# Patient Record
Sex: FEMALE | Race: WHITE | URBAN - METROPOLITAN AREA
[De-identification: names, ages, dates, MRNs, and addresses within clinical notes are randomized per-mention and may not be internally consistent; named-entity substitution may affect disease eponyms.]

---

## 2017-01-17 ENCOUNTER — TELEPHONE (OUTPATIENT)
Dept: FAMILY MEDICINE | Facility: CLINIC | Age: 47
End: 2017-01-17

## 2017-01-17 ENCOUNTER — OFFICE VISIT (OUTPATIENT)
Dept: FAMILY MEDICINE | Facility: CLINIC | Age: 47
End: 2017-01-17
Payer: COMMERCIAL

## 2017-01-17 VITALS
WEIGHT: 140 LBS | OXYGEN SATURATION: 98 % | DIASTOLIC BLOOD PRESSURE: 62 MMHG | BODY MASS INDEX: 23.9 KG/M2 | HEIGHT: 64 IN | TEMPERATURE: 98.5 F | SYSTOLIC BLOOD PRESSURE: 98 MMHG | HEART RATE: 106 BPM

## 2017-01-17 DIAGNOSIS — J01.90 ACUTE SINUSITIS TREATED WITH ANTIBIOTICS IN THE PAST 60 DAYS: Primary | ICD-10-CM

## 2017-01-17 PROCEDURE — 99213 OFFICE O/P EST LOW 20 MIN: CPT | Performed by: INTERNAL MEDICINE

## 2017-01-17 RX ORDER — DOXYCYCLINE HYCLATE 100 MG
100 TABLET ORAL 2 TIMES DAILY
Qty: 14 TABLET | Refills: 0 | Status: SHIPPED | OUTPATIENT
Start: 2017-01-17 | End: 2018-04-03

## 2017-01-17 NOTE — NURSING NOTE
"Chief Complaint   Patient presents with     Cough       Initial BP 98/62 mmHg  Pulse 106  Temp(Src) 98.5  F (36.9  C) (Tympanic)  Ht 5' 4\" (1.626 m)  Wt 140 lb (63.504 kg)  BMI 24.02 kg/m2  SpO2 98%  Breastfeeding? No Estimated body mass index is 24.02 kg/(m^2) as calculated from the following:    Height as of this encounter: 5' 4\" (1.626 m).    Weight as of this encounter: 140 lb (63.504 kg).  BP completed using cuff size: regular    Health Maintenance Due   Topic Date Due     PAP Q3 YR  08/07/2015     DEPRESSION ACTION PLAN Q1 YR (NO INBASKET)  08/19/2016     INFLUENZA VACCINE (SYSTEM ASSIGNED)  09/01/2016         Jaimie Taylor MA 1/17/17          "

## 2017-01-17 NOTE — TELEPHONE ENCOUNTER
Patient states she has had an ongoing cough and sinus concern since Thanksgiving.  States after jacob she wet online to [employer] and received prescription for cough which we think was tessalon pearls.  She then followed up with them due to not resolving and they prescribed her Amoxicillin which she has not had any relief from.  She completed the Amoxicillin early last week.  Still has deep productive cough, has had a headache for 3 days which she has been taking Advil for and lots of drainage.  Advised patient to follow up in clinic with appointment for hands on evaluation by provider and possible xray.  Patient agreed with plan and was assisted with scheduling appointment today.  Cherrie Amado RN  [facility] [employer]

## 2017-01-17 NOTE — PROGRESS NOTES
SUBJECTIVE:                                                    Heber Rosales is a 46 year old female who presents to clinic today for the following health issues:      RESPIRATORY SYMPTOMS      Duration: Since Thanksgiving     Description  nasal congestion, rhinorrhea, cough and fatigue/malaise, and headache.     Severity: moderate    Accompanying signs and symptoms: None    History (predisposing factors):  none    Precipitating or alleviating factors: None    Therapies tried and outcome:  Did go online to [employer] and was given medication for the cough and amoxicillin. It has not been effective patient still complains of cough. Was triaged today, see triage encounter.      Heber has been congested and feeling poorly for weeks now.  She was diagnosed with a sinus infection on [facility] and given a rx for amoxicillin.  She completed this about a week ago.  After taking the antibiotic she felt quite a bit better, maybe at 70% now, but since completing it her symptoms have plateaued. She still is taking 800mg of ibuprofen every 4-6 hours to keep from having a splitting headache and has sinus pressure/drainage and a cough.  The drainage is more clear and cough is no longer constant throughout the day.  She has poor energy and poor appetite.         Patient Active Problem List   Diagnosis     CARDIOVASCULAR SCREENING; LDL GOAL LESS THAN 160     Panic attacks     History of partial thyroidectomy     Recurrent major depressive disorder, in partial remission (H)     Past Surgical History   Procedure Laterality Date     Laparoscopic myomectomy uterus  2006     D & c  2007     endometrial polyp excised     D & c  2008     endometriosis     Thyroidectomy  8/10/2012     left lobe, benign colloid nodule        Social History   Substance Use Topics     Smoking status: Never Smoker      Smokeless tobacco: Never Used     Alcohol Use: 0.6 oz/week     1 Standard drinks or equivalent per week      Comment: occ wine      "Family History   Problem Relation Age of Onset     Unknown/Adopted Mother      doesn't know maternal side     CANCER Father      bladder. Agent orange           ROS:  Constitutional, HEENT, cardiovascular, pulmonary, gi and gu systems are negative, except as otherwise noted.    OBJECTIVE:                                                    BP 98/62 mmHg  Pulse 106  Temp(Src) 98.5  F (36.9  C) (Tympanic)  Ht 5' 4\" (1.626 m)  Wt 140 lb (63.504 kg)  BMI 24.02 kg/m2  SpO2 98%  Breastfeeding? No  Body mass index is 24.02 kg/(m^2).    Gen: tired appearing, pleasant woman, no distress  HEENT: PERRL, no conjunctival injection, no posterior pharynx erythema, MMM.  TM normal b/l.  + maxillary sinus tenderness.   Neck: supple, no LAD  Pulm: breathing comfortably, CTAB, ? Rales at very bases b/l.   CV: mild tachycardia, regular, normal S1 and S2, no murmurs  Ext: 2+ radial pulses           ASSESSMENT/PLAN:                                                        1. Acute sinusitis treated with antibiotics in the past 60 days  It sounds like she has a partially treated sinus infection. She did get better, but continues to feel poorly with sinus pain a week after completing amoxicillin.   - doxycycline (VIBRA-TABS) 100 MG tablet; Take 1 tablet (100 mg) by mouth 2 times daily  Dispense: 14 tablet; Refill: 0  - reviewed ibuprofen dosing, symptomatic management.     F/U as needed for persistent or worsening symptoms.       Mira Buck MD  [facility]  "

## 2017-01-17 NOTE — PATIENT INSTRUCTIONS
You can use Afrin nasal spray for 3-5 days to help with congestion.   Maximum ibuprofen is 800mg three times per day.

## 2017-01-25 DIAGNOSIS — F33.41 RECURRENT MAJOR DEPRESSIVE DISORDER, IN PARTIAL REMISSION (H): Primary | ICD-10-CM

## 2017-01-25 NOTE — TELEPHONE ENCOUNTER
zoloft     Last Written Prescription Date: 11/30/16  Last Fill Quantity: 135, # refills: 0  Last Office Visit with [facility] primary care provider:  1/17/17        Last PHQ-9 score on record=   PHQ-9 SCORE 11/30/2016   Total Score -   Total Score MyChart 2 (Minimal depression)   Total Score -       Routing refill request to provider for review/approval because:  Drug interaction warning    Gracy Burgos RN   [facility] - Triage

## 2017-01-25 NOTE — Clinical Note
My Depression Action Plan  Name: Heber Rosales   Date of Birth 1970  Date: 1/27/2017    My doctor: Maria Eugenia Sams   My clinic: [address] [address]  [phone]          GREEN    ZONE   Good Control    What it looks like:     Things are going generally well. You have normal up s and down s. You may even feel depressed from time to time, but bad moods usually last less than a day.   What you need to do:  1. Continue to care for yourself (see self care plan)  2. Check your depression survival kit and update it as needed  3. Follow your physician s recommendations including any medication.  4. Do not stop taking medication unless you consult with your physician first.           YELLOW         ZONE Getting Worse    What it looks like:     Depression is starting to interfere with your life.     It may be hard to get out of bed; you may be starting to isolate yourself from others.    Symptoms of depression are starting to last most all day and this has happened for several days.     You may have suicidal thoughts but they are not constant.   What you need to do:     1. Call your care team, your response to treatment will improve if you keep your care team informed of your progress. Yellow periods are signs an adjustment may need to be made.     2. Continue your self-care, even if you have to fake it!    3. Talk to someone in your support network    4. Open up your depression survival kit           RED    ZONE Medical Alert - Get Help    What it looks like:     Depression is seriously interfering with your life.     You may experience these or other symptoms: You can t get out of bed most days, can t work or engage in other necessary activities, you have trouble taking care of basic hygiene, or basic responsibilities, thoughts of suicide or death that will not go away, self-injurious behavior.     What you need to do:  1. Call your care  team and request a same-day appointment. If they are not available (weekends or after hours) call your local crisis line, emergency room or 911.      Electronically signed by: Maria Eugenia Sams, January 27, 2017    Depression Self Care Plan / Survival Kit    Self-Care for Depression  Here s the deal. Your body and mind are really not as separate as most people think.  What you do and think affects how you feel and how you feel influences what you do and think. This means if you do things that people who feel good do, it will help you feel better.  Sometimes this is all it takes.  There is also a place for medication and therapy depending on how severe your depression is, so be sure to consult with your medical provider and/ or Behavioral Health Consultant if your symptoms are worsening or not improving.     In order to better manage my stress, I will:    Exercise  Get some form of exercise, every day. This will help reduce pain and release endorphins, the  feel good  chemicals in your brain. This is almost as good as taking antidepressants!  This is not the same as joining a gym and then never going! (they count on that by the way ) It can be as simple as just going for a walk or doing some gardening, anything that will get you moving.      Hygiene   Maintain good hygiene (Get out of bed in the morning, Make your bed, Brush your teeth, Take a shower, and Get dressed like you were going to work, even if you are unemployed).  If your clothes don't fit try to get ones that do.    Diet  I will strive to eat foods that are good for me, drink plenty of water, and avoid excessive sugar, caffeine, alcohol, and other mood-altering substances.  Some foods that are helpful in depression are: complex carbohydrates, B vitamins, flaxseed, fish or fish oil, fresh fruits and vegetables.    Psychotherapy  I agree to participate in Individual Therapy (if recommended).    Medication  If prescribed medications, I agree to take them.  Missing  doses can result in serious side effects.  I understand that drinking alcohol, or other illicit drug use, may cause potential side effects.  I will not stop my medication abruptly without first discussing it with my provider.    Staying Connected With Others  I will stay in touch with my friends, family members, and my primary care provider/team.    Use your imagination  Be creative.  We all have a creative side; it doesn t matter if it s oil painting, sand castles, or mud pies! This will also kick up the endorphins.    Witness Beauty  (AKA stop and smell the roses) Take a look outside, even in mid-winter. Notice colors, textures. Watch the squirrels and birds.     Service to others  Be of service to others.  There is always someone else in need.  By helping others we can  get out of ourselves  and remember the really important things.  This also provides opportunities for practicing all the other parts of the program.    Humor  Laugh and be silly!  Adjust your TV habits for less news and crime-drama and more comedy.    Control your stress  Try breathing deep, massage therapy, biofeedback, and meditation. Find time to relax each day.     My support system    Clinic Contact:  Phone number:    Contact 1:  Phone number:    Contact 2:  Phone number:    Worship/:  Phone number:    Therapist:  Phone number:    Local crisis center:    Phone number:    Other community support:  Phone number:

## 2017-01-27 RX ORDER — SERTRALINE HYDROCHLORIDE 100 MG/1
TABLET, FILM COATED ORAL
Qty: 135 TABLET | Refills: 1 | Status: SHIPPED | OUTPATIENT
Start: 2017-01-27 | End: 2018-07-31

## 2017-08-19 ENCOUNTER — HEALTH MAINTENANCE LETTER (OUTPATIENT)
Age: 47
End: 2017-08-19

## 2018-04-03 ENCOUNTER — OFFICE VISIT (OUTPATIENT)
Dept: SURGERY | Facility: CLINIC | Age: 48
End: 2018-04-03
Payer: COMMERCIAL

## 2018-04-03 VITALS
WEIGHT: 126 LBS | SYSTOLIC BLOOD PRESSURE: 100 MMHG | HEIGHT: 64 IN | DIASTOLIC BLOOD PRESSURE: 70 MMHG | HEART RATE: 111 BPM | BODY MASS INDEX: 21.51 KG/M2

## 2018-04-03 DIAGNOSIS — K40.90 RIGHT INGUINAL HERNIA: Primary | ICD-10-CM

## 2018-04-03 PROCEDURE — 99204 OFFICE O/P NEW MOD 45 MIN: CPT | Performed by: SURGERY

## 2018-04-03 NOTE — MR AVS SNAPSHOT
"              After Visit Summary   4/3/2018    Heber Rosales    MRN: [id number]           Patient Information     Date Of Birth          1970        Visit Information        Provider Department      4/3/2018 11:15 AM Nuha Powell MD [facility]       Follow-ups after your visit        Who to contact     If you have questions or need follow up information about today's clinic visit or your schedule please contact [facility] directly at [phone].  Normal or non-critical lab and imaging results will be communicated to you by "[employer]hart, letter or phone within 4 business days after the clinic has received the results. If you do not hear from us within 7 days, please contact the clinic through [employer] or phone. If you have a critical or abnormal lab result, we will notify you by phone as soon as possible.  Submit refill requests through [employer] or call your pharmacy and they will forward the refill request to us. Please allow 3 business days for your refill to be completed.          Additional Information About Your Visit        MyChart Information     [employer] gives you secure access to your electronic health record. If you see a primary care provider, you can also send messages to your care team and make appointments. If you have questions, please call your primary care clinic.  If you do not have a primary care provider, please call [phone] and they will assist you.        Care EveryWhere ID     This is your Care EveryWhere ID. This could be used by other organizations to access your [place] medical records  [id number]        Your Vitals Were     Pulse Height BMI (Body Mass Index)             111 5' 4\" (1.626 m) 21.63 kg/m2          Blood Pressure from Last 3 Encounters:   04/03/18 100/70   01/17/17 98/62   06/30/16 106/80    Weight from Last 3 Encounters:   04/03/18 126 lb (57.2 kg)   01/17/17 140 lb (63.5 kg) "   06/30/16 145 lb (65.8 kg)              Today, you had the following     No orders found for display       Primary Care Provider Office Phone # Fax #    Sierra Bowling [phone]       [facility] [address] [address]        Equal Access to Services     [facility] KATEY : Hadii aad ku hadasho Soomaali, waaxda luqadaha, qaybta kaalmada adeegyada, waxay idiin hayaan adeeg khbrittanysh la'gigin . So [facility] [phone].    ATENCIÓN: Si habla español, tiene a hansen disposición servicios gratuitos de asistencia lingüística. Llame al [phone].    We comply with applicable federal civil rights laws and [place] laws. We do not discriminate on the basis of race, color, national origin, age, disability, sex, sexual orientation, or gender identity.            Thank you!     Thank you for choosing [facility]  for your care. Our goal is always to provide you with excellent care. Hearing back from our patients is one way we can continue to improve our services. Please take a few minutes to complete the written survey that you may receive in the mail after your visit with us. Thank you!             Your Updated Medication List - Protect others around you: Learn how to safely use, store and throw away your medicines at www.disposemymeds.org.          This list is accurate as of 4/3/18 11:30 AM.  Always use your most recent med list.                   Brand Name Dispense Instructions for use Diagnosis    ALPRAZolam 0.25 MG tablet    XANAX    30 tablet    Take 1 tablet (0.25 mg) by mouth 2 times daily as needed for anxiety    Panic attacks       order for DME     1 Device    Equipment being ordered: wrist thumb right    Right wrist pain       sertraline 100 MG tablet    ZOLOFT    135 tablet    TAKE 1 1/2 TABLETS BY MOUTH DAILY    Recurrent major depressive disorder, in partial remission (H)

## 2018-04-03 NOTE — LETTER
April 3, 2018    Re: Heber Rosales - 1970    HISTORY OF PRESENT ILLNESS:  Heber Rosales is a 47 year old female who is seen in consultation at the request of Dr. Bowling for evaluation of right groin pain. She reports for the past 6 months she has had intermittent pain in her right groin. In November she noticed a bulge that would come and go. She is on spring break and saw her Ob/GYN yesterday at [facility] and was diagnosed with a right inguinal hernia. She denies hsitory of constipation. She has a single episode of diarrhea a few weeks ago. She reports mild low grade nausea and attributes this to increased stress lately. She has a history of a right sided uterine fibroid which was laparoscopically excised. She denies any obstructive symptoms.      REVIEW OF SYSTEMS:  10 point review of systems completed and otherwise negative aside from as listed in HPI.   Vitals: There were no vitals taken for this visit.  BMI= There is no height or weight on file to calculate BMI.     EXAM:  GENERAL: healthy, alert and no distress   PSYCH: pleasant, normal affect  HEENT: moist mucus membranes, no scleral icterus  CARDIOVASCULAR:  RRR  RESPIRATORY: non labored breathing  NECK: Neck supple. No adenopathy. Thyroid symmetric, normal size,  GI: soft, nontender, nondistended,  no hepatosplenomegaly, normal bowel sounds. Well healed laparoscopic scars.   : right inguinal hernia, reducible in supine position, not when standing. No hernias on left. Tender with reduction  Extremities: warm and well perfused, no edema  SKIN: No suspicious lesions or rashes    LYMPH: no axillary adenopathy     All labs and imaging personally reviewed and significant for: none     ASSESSMENT:  Heber Rosales is a 47 year old with a PMH s/f thyroid lobectomy who presents with a symptomatic right inguinal hernia. We discussed the natural history of hernias and risks of incarceration/strangulation. I would recommend repair.  We discussed open vs laparoscopic repair. I think she is a good candidate for laparoscopic repair as this is reducible and there is not a large bulge on exam. This will allow evaluation for femoral hernia as well.       PLAN:  Laparoscopic right inguinal hernia repair  It was my pleasure to participate in the care of Heber Rosalse in clinic today. Thank you for this consultation.       Nuha Powell MD

## 2018-04-03 NOTE — PROGRESS NOTES
[facility] Consultation    CHIEF COMPLAINT:  Right groin pain    HISTORY OF PRESENT ILLNESS:  Heber Rosales is a 47 year old female who is seen in consultation at the request of Dr. Bowling for evaluation of right groin pain. She reports for the past 6 months she has had intermittent pain in her right groin. In November she noticed a bulge that would come and go. She is on spring break and saw her Ob/GYN yesterday at [facility] and was diagnosed with a right inguinal hernia. She denies hsitory of constipation. She has a single episode of diarrhea a few weeks ago. She reports mild low grade nausea and attributes this to increased stress lately. She has a history of a right sided uterine fibroid which was laparoscopically excised. She denies any obstructive symptoms.     REVIEW OF SYSTEMS:  10 point review of systems completed and otherwise negative aside from as listed in HPI.     Past Medical History:   Diagnosis Date     Blood clot in the legs     superficial blood clots in leg post delivery     Colloid thyroid nodule 3/2012    left lobe, benign follicular adenoma     Endometriosis      Mild major depression (H)      Panic attacks      Uterine fibroid 2005    pedunculated right side of uterus       Past Surgical History:   Procedure Laterality Date     D & C  2007    endometrial polyp excised     D & C  2008    endometriosis     LAPAROSCOPIC MYOMECTOMY UTERUS  2006     THYROIDECTOMY  8/10/2012    left lobe, benign colloid nodule        Family History   Problem Relation Age of Onset     Unknown/Adopted Mother      doesn't know maternal side     CANCER Father      bladder. Agent orange       Social History   Substance Use Topics     Smoking status: Never Smoker     Smokeless tobacco: Never Used     Alcohol use 0.6 oz/week     1 Standard drinks or equivalent per week      Comment: occ wine       Patient Active Problem List   Diagnosis     CARDIOVASCULAR SCREENING; LDL GOAL LESS THAN  160     Panic attacks     History of partial thyroidectomy     Recurrent major depressive disorder, in partial remission (H)       No Known Allergies    Current Outpatient Prescriptions   Medication Sig Dispense Refill     sertraline (ZOLOFT) 100 MG tablet TAKE 1 1/2 TABLETS BY MOUTH DAILY 135 tablet 1     doxycycline (VIBRA-TABS) 100 MG tablet Take 1 tablet (100 mg) by mouth 2 times daily 14 tablet 0     sertraline (ZOLOFT) 100 MG tablet TAKE 1 1/2 TABLETS BY MOUTH DAILY 135 tablet 0     sertraline (ZOLOFT) 100 MG tablet Take 2 tablets (200 mg) by mouth daily TAKE 1 1/2 TABLETS BY MOUTH DAILY       naproxen (NAPROSYN) 500 MG tablet Take 1 tablet (500 mg) by mouth 2 times daily as needed for moderate pain 30 tablet 1     order for DME Equipment being ordered: wrist thumb right 1 Device 0     ALPRAZolam (XANAX) 0.25 MG tablet Take 1 tablet (0.25 mg) by mouth 2 times daily as needed for anxiety 30 tablet 0       Vitals: There were no vitals taken for this visit.  BMI= There is no height or weight on file to calculate BMI.    EXAM:  GENERAL: healthy, alert and no distress   PSYCH: pleasant, normal affect  HEENT: moist mucus membranes, no scleral icterus  CARDIOVASCULAR:  RRR  RESPIRATORY: non labored breathing  NECK: Neck supple. No adenopathy. Thyroid symmetric, normal size,  GI: soft, nontender, nondistended,  no hepatosplenomegaly, normal bowel sounds. Well healed laparoscopic scars.   : right inguinal hernia, reducible in supine position, not when standing. No hernias on left. Tender with reduction  Extremities: warm and well perfused, no edema  SKIN: No suspicious lesions or rashes    LYMPH: no axillary adenopathy    All labs and imaging personally reviewed and significant for: none    ASSESSMENT:  Heber Rosales is a 47 year old with a PMH s/f thyroid lobectomy who presents with a symptomatic right inguinal hernia. We discussed the natural history of hernias and risks of incarceration/strangulation. I  would recommend repair. We discussed open vs laparoscopic repair. I think she is a good candidate for laparoscopic repair as this is reducible and there is not a large bulge on exam. This will allow evaluation for femoral hernia as well.       PLAN:  Laparoscopic right inguinal hernia repair    It was my pleasure to participate in the care of Heber Rosales in clinic today. Thank you for this consultation.         Nuha Powell MD    Please route or send letter to:  Primary Care Provider (PCP) and Referring Provider

## 2018-04-04 ENCOUNTER — TELEPHONE (OUTPATIENT)
Dept: SURGERY | Facility: CLINIC | Age: 48
End: 2018-04-04

## 2018-04-04 NOTE — TELEPHONE ENCOUNTER
Type of surgery: Laparoscopic right inguinal hernia repair  Location of surgery: [facility]  Date and time of surgery: 4/30/18 at 10:30am  Surgeon: Dr. Nuha Powell  Pre-Op Appt Date: Patient to schedule  Post-Op Appt Date: Patient to schedule   Packet sent out: Yes  Pre-cert/Authorization completed:  Not Applicable  Date: 4/3/18

## 2018-04-06 ENCOUNTER — OFFICE VISIT (OUTPATIENT)
Dept: FAMILY MEDICINE | Facility: CLINIC | Age: 48
End: 2018-04-06
Payer: COMMERCIAL

## 2018-04-06 VITALS — SYSTOLIC BLOOD PRESSURE: 120 MMHG | HEART RATE: 76 BPM | DIASTOLIC BLOOD PRESSURE: 80 MMHG

## 2018-04-06 DIAGNOSIS — K40.90 RIGHT INGUINAL HERNIA: ICD-10-CM

## 2018-04-06 DIAGNOSIS — Z01.818 PREOP GENERAL PHYSICAL EXAM: Primary | ICD-10-CM

## 2018-04-06 DIAGNOSIS — F33.41 RECURRENT MAJOR DEPRESSIVE DISORDER, IN PARTIAL REMISSION (H): ICD-10-CM

## 2018-04-06 DIAGNOSIS — F41.0 PANIC ATTACKS: ICD-10-CM

## 2018-04-06 PROCEDURE — 99214 OFFICE O/P EST MOD 30 MIN: CPT | Performed by: INTERNAL MEDICINE

## 2018-04-06 RX ORDER — ALPRAZOLAM 0.25 MG
0.25 TABLET ORAL 2 TIMES DAILY PRN
Qty: 30 TABLET | Refills: 0 | Status: SHIPPED | OUTPATIENT
Start: 2018-04-06 | End: 2019-07-17

## 2018-04-06 NOTE — PROGRESS NOTES
[place] [id number] [facility] [address]  [phone]  Dept: [phone]    PRE-OP EVALUATION:  Today's date: 2018      **PREOP FAXED** Annalee Wright CMA     Heber Rosales (: 1970) presents for pre-operative evaluation assessment as requested by Dr. Powell.  She requires evaluation and anesthesia risk assessment prior to undergoing surgery/procedure for treatment of right inguinal hernia .    Fax number for surgical facility: na  Primary Physician: Sierra Bowling  Type of Anesthesia Anticipated: General    Patient has a Health Care Directive or Living Will:  Unsure     Preop Questions 2018   Who is doing your surgery? Dr. Powell   What are you having done? Inguinal hernia surgery   Date of Surgery/Procedure: 18   Facility or Hospital where procedure/surgery will be performed: [facility]   1.  Do you have a history of Heart attack, stroke, stent, coronary bypass surgery, or other heart surgery? No   2.  Do you ever have any pain or discomfort in your chest? No   3.  Do you have a history of  Heart Failure? No   4.   Are you troubled by shortness of breath when:  walking on a level surface, or up a slight hill, or at night? No   5.  Do you currently have a cold, bronchitis or other respiratory infection? YES - getting over a cold   6.  Do you have a cough, shortness of breath, or wheezing? YES - getting over a cold   7.  Do you sometimes get pains in the calves of your legs when you walk? No   8. Do you or anyone in your family have previous history of blood clots? YES - Heber had a blood clot post-partum   9.  Do you or does anyone in your family have a serious bleeding problem such as prolonged bleeding following surgeries or cuts? No   10. Have you ever had problems with anemia or been told to take iron pills? No   11. Have you had any abnormal blood loss such as black, tarry or bloody stools, or abnormal vaginal  bleeding? No   12. Have you ever had a blood transfusion? No   13. Have you or any of your relatives ever had problems with anesthesia? No   14. Do you have sleep apnea, excessive snoring or daytime drowsiness? No   15. Do you have any prosthetic heart valves? No   16. Do you have prosthetic joints? No   17. Is there any chance that you may be pregnant? No         HPI:     HPI related to upcoming procedure: right groin pain, found to have hernia.  Undergoing repair.      Getting over a cold, no SOB or fevers. Otherwise healthy.      Depression - just restarted sertraline, at 50mg daily.  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  Last filled for #30 in 4/2016.       MEDICAL HISTORY:     Patient Active Problem List    Diagnosis Date Noted     Recurrent major depressive disorder, in partial remission (H) 06/30/2016     Priority: Medium     Panic attacks      Priority: Medium     History of partial thyroidectomy 08/10/2012     Priority: Medium     CARDIOVASCULAR SCREENING; LDL GOAL LESS THAN 160 03/07/2012     Priority: Medium      Past Medical History:   Diagnosis Date     Blood clot in the legs     superficial blood clots in leg post delivery     Colloid thyroid nodule 3/2012    left lobe, benign follicular adenoma     Endometriosis      Mild major depression (H)      Panic attacks      Uterine fibroid 2005    pedunculated right side of uterus     Past Surgical History:   Procedure Laterality Date     D & C  2007    endometrial polyp excised     D & C  2008    endometriosis     LAPAROSCOPIC MYOMECTOMY UTERUS  2006     THYROIDECTOMY  8/10/2012    left lobe, benign colloid nodule      Current Outpatient Prescriptions   Medication Sig Dispense Refill     ALPRAZolam (XANAX) 0.25 MG tablet Take 1 tablet (0.25 mg) by mouth 2 times daily as needed for anxiety 30 tablet 0     sertraline (ZOLOFT) 100 MG tablet TAKE 1 1/2 TABLETS BY MOUTH DAILY (Patient  taking differently: 50mg daily) 135 tablet 1     order for DME Equipment being ordered: wrist thumb right (Patient not taking: Reported on 4/6/2018) 1 Device 0     OTC products: advil prn     No Known Allergies   Latex Allergy: NO    Social History   Substance Use Topics     Smoking status: Never Smoker     Smokeless tobacco: Never Used     Alcohol use 0.6 oz/week     1 Standard drinks or equivalent per week      Comment: occ wine     History   Drug Use No       REVIEW OF SYSTEMS:   Constitutional, HEENT, cardiovascular, pulmonary, gi, heme, and psych systems are negative, except as otherwise noted.    EXAM:   /80 (BP Location: Left arm, Patient Position: Chair, Cuff Size: Adult Regular)  Pulse 76  LMP 03/19/2018    Gen: well appearing, pleasant woman, no distress  HEENT: PERRL, sclera nonicteric, oropharynx clear, MMM  Neck: supple, no LAD  Pulm: breathing comfortably, CTAB, no wheezes or rales  CV: RRR, normal S1 and S2, no murmurs  Abd: BS present, soft, nontender, nondistended  Ext: 2+ distal pulses, no LE edema       DIAGNOSTICS:   EKG: Not indicated due to non-vascular surgery and low risk of event (age <65 and without cardiac risk factors)    Recent Labs   Lab Test  11/06/12   1553  08/09/12   0812   HGB  13.8  15.0   PLT  198   --    NA  144   --    POTASSIUM  4.2   --    CR  0.70   --         IMPRESSION:   Reason for surgery/procedure: right inguinal hernia   Diagnosis/reason for consult: pre-op    The proposed surgical procedure is considered INTERMEDIATE risk.    REVISED CARDIAC RISK INDEX  The patient has the following serious cardiovascular risks for perioperative complications such as (MI, PE, VFib and 3  AV Block):  No serious cardiac risks  INTERPRETATION: 0 risks: Class I (very low risk - 0.4% complication rate)    The patient has the following additional risks for perioperative complications:  No identified additional risks      ICD-10-CM    1. Preop general physical exam Z01.818    2.  Right inguinal hernia K40.90    3. Recurrent major depressive disorder, in partial remission (H) F33.41    4. Panic attacks F41.0 ALPRAZolam (XANAX) 0.25 MG tablet       RECOMMENDATIONS:       APPROVAL GIVEN to proceed with proposed procedure, without further diagnostic evaluation       Signed Electronically by: Mira Buck MD    Copy of this evaluation report is provided to requesting physician.    [facility] Preop Guidelines    Revised Cardiac Risk Index

## 2018-04-06 NOTE — MR AVS SNAPSHOT
After Visit Summary   4/6/2018    Heber Rosales    MRN: [id number]           Patient Information     Date Of Birth          1970        Visit Information        Provider Department      4/6/2018 1:00 PM Mira Buck MD [facility] [place]        Today's Diagnoses     Preop general physical exam    -  1    Panic attacks          Care Instructions      Before Your Surgery      Call your surgeon if there is any change in your health. This includes signs of a cold or flu (such as a sore throat, runny nose, cough, rash or fever).    Do not smoke, drink alcohol or take over the counter medicine (unless your surgeon or primary care doctor tells you to) for the 24 hours before and after surgery.    If you take prescribed drugs: Follow your doctor s orders about which medicines to take and which to stop until after surgery.    Eating and drinking prior to surgery: follow the instructions from your surgeon    Take a shower or bath the night before surgery. Use the soap your surgeon gave you to gently clean your skin. If you do not have soap from your surgeon, use your regular soap. Do not shave or scrub the surgery site.  Wear clean pajamas and have clean sheets on your bed.           Follow-ups after your visit        Your next 10 appointments already scheduled     Apr 30, 2018   Procedure with Nuha Powell MD   [facility] (--)    [address] [place] [address]   [phone]            Apr 30, 2018 10:15 AM CDT   [facility] Day Surgery with Nuha Powell MD   [facility] ([facility])    [facility] ([facility])   [phone]              Who to contact     If you have questions or need follow up information about today's clinic visit or your schedule please contact [facility] directly at [phone].  Normal or  non-critical lab and imaging results will be communicated to you by [employer] Videoshart, letter or phone within 4 business days after the clinic has received the results. If you do not hear from us within 7 days, please contact the clinic through [employer]ke[employer] or phone. If you have a critical or abnormal lab result, we will notify you by phone as soon as possible.  Submit refill requests through Trekea or call your pharmacy and they will forward the refill request to us. Please allow 3 business days for your refill to be completed.          Additional Information About Your Visit        Trekea Information     Trekea gives you secure access to your electronic health record. If you see a primary care provider, you can also send messages to your care team and make appointments. If you have questions, please call your primary care clinic.  If you do not have a primary care provider, please call [phone] and they will assist you.        Care EveryWhere ID     This is your Care EveryWhere ID. This could be used by other organizations to access your [facility] medical records  [id number]        Your Vitals Were     Pulse Last Period                76 03/19/2018           Blood Pressure from Last 3 Encounters:   04/06/18 120/80   04/03/18 100/70   01/17/17 98/62    Weight from Last 3 Encounters:   04/03/18 126 lb (57.2 kg)   01/17/17 140 lb (63.5 kg)   06/30/16 145 lb (65.8 kg)              Today, you had the following     No orders found for display         Today's Medication Changes          These changes are accurate as of 4/6/18  1:23 PM.  If you have any questions, ask your nurse or doctor.               These medicines have changed or have updated prescriptions.        Dose/Directions    sertraline 100 MG tablet   Commonly known as:  ZOLOFT   This may have changed:  See the new instructions.   Used for:  Recurrent major depressive disorder, in partial remission (H)        TAKE 1 1/2 TABLETS BY MOUTH DAILY   Quantity:  135 tablet    Refills:  1            Where to get your medicines      Some of these will need a paper prescription and others can be bought over the counter.  Ask your nurse if you have questions.     Bring a paper prescription for each of these medications     ALPRAZolam 0.25 MG tablet                Primary Care Provider Office Phone # Fax #    Sierra Bowling [phone]       [facility] [address] [place] [address]        Equal Access to Services     [facility] : Hadii aad ku hadasho Soomaali, waaxda luqadaha, qaybta kaalmada adeegyada, waxay idiin hayaan adeeg kharash la'omari . So [facility] [phone].    ATENCIÓN: Si habla espmalorie, tiene a hansen disposición servicios gratuitos de asistencia lingüística. [facility] [phone].    We comply with applicable federal civil rights laws and [place] laws. We do not discriminate on the basis of race, color, national origin, age, disability, sex, sexual orientation, or gender identity.            Thank you!     [facility] you [facility]. Our goal is always to provide you with excellent care. Hearing back from our patients is one way we can continue to improve our services. Please take a few minutes to complete the written survey that you may receive in the mail after your visit with us. Thank you!             Your Updated Medication List - Protect others around you: Learn how to safely use, store and throw away your medicines at www.disposemymeds.org.          This list is accurate as of 4/6/18  1:23 PM.  Always use your most recent med list.                   Brand Name Dispense Instructions for use Diagnosis    ALPRAZolam 0.25 MG tablet    XANAX    30 tablet    Take 1 tablet (0.25 mg) by mouth 2 times daily as needed for anxiety    Panic attacks       order for DME     1 Device    Equipment being ordered: wrist thumb right    Right wrist pain       sertraline 100 MG tablet    ZOLOFT    135 tablet    TAKE 1 1/2  TABLETS BY MOUTH DAILY    Recurrent major depressive disorder, in partial remission (H)

## 2018-04-26 NOTE — H&P (VIEW-ONLY)
[place] [address] [facility] [address]  [phone]  Dept: [phone]    PRE-OP EVALUATION:  Today's date: 2018      **PREOP FAXED** Annalee Wright CMA     Heber Rosales (: 1970) presents for pre-operative evaluation assessment as requested by Dr. Powell.  She requires evaluation and anesthesia risk assessment prior to undergoing surgery/procedure for treatment of right inguinal hernia .    Fax number for surgical facility: na  Primary Physician: Sierra Bowling  Type of Anesthesia Anticipated: General    Patient has a Health Care Directive or Living Will:  Unsure     Preop Questions 2018   Who is doing your surgery? Dr. Powell   What are you having done? Inguinal hernia surgery   Date of Surgery/Procedure: 18   Facility or Hospital where procedure/surgery will be performed: [facility]   1.  Do you have a history of Heart attack, stroke, stent, coronary bypass surgery, or other heart surgery? No   2.  Do you ever have any pain or discomfort in your chest? No   3.  Do you have a history of  Heart Failure? No   4.   Are you troubled by shortness of breath when:  walking on a level surface, or up a slight hill, or at night? No   5.  Do you currently have a cold, bronchitis or other respiratory infection? YES - getting over a cold   6.  Do you have a cough, shortness of breath, or wheezing? YES - getting over a cold   7.  Do you sometimes get pains in the calves of your legs when you walk? No   8. Do you or anyone in your family have previous history of blood clots? YES - Heber had a blood clot post-partum   9.  Do you or does anyone in your family have a serious bleeding problem such as prolonged bleeding following surgeries or cuts? No   10. Have you ever had problems with anemia or been told to take iron pills? No   11. Have you had any abnormal blood loss such as black, tarry or bloody stools, or abnormal vaginal  bleeding? No   12. Have you ever had a blood transfusion? No   13. Have you or any of your relatives ever had problems with anesthesia? No   14. Do you have sleep apnea, excessive snoring or daytime drowsiness? No   15. Do you have any prosthetic heart valves? No   16. Do you have prosthetic joints? No   17. Is there any chance that you may be pregnant? No         HPI:     HPI related to upcoming procedure: right groin pain, found to have hernia.  Undergoing repair.      Getting over a cold, no SOB or fevers. Otherwise healthy.      Depression - just restarted sertraline, at 50mg daily.  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  Last filled for #30 in 4/2016.       MEDICAL HISTORY:     Patient Active Problem List    Diagnosis Date Noted     Recurrent major depressive disorder, in partial remission (H) 06/30/2016     Priority: Medium     Panic attacks      Priority: Medium     History of partial thyroidectomy 08/10/2012     Priority: Medium     CARDIOVASCULAR SCREENING; LDL GOAL LESS THAN 160 03/07/2012     Priority: Medium      Past Medical History:   Diagnosis Date     Blood clot in the legs     superficial blood clots in leg post delivery     Colloid thyroid nodule 3/2012    left lobe, benign follicular adenoma     Endometriosis      Mild major depression (H)      Panic attacks      Uterine fibroid 2005    pedunculated right side of uterus     Past Surgical History:   Procedure Laterality Date     D & C  2007    endometrial polyp excised     D & C  2008    endometriosis     LAPAROSCOPIC MYOMECTOMY UTERUS  2006     THYROIDECTOMY  8/10/2012    left lobe, benign colloid nodule      Current Outpatient Prescriptions   Medication Sig Dispense Refill     ALPRAZolam (XANAX) 0.25 MG tablet Take 1 tablet (0.25 mg) by mouth 2 times daily as needed for anxiety 30 tablet 0     sertraline (ZOLOFT) 100 MG tablet TAKE 1 1/2 TABLETS BY MOUTH DAILY (Patient  taking differently: 50mg daily) 135 tablet 1     order for DME Equipment being ordered: wrist thumb right (Patient not taking: Reported on 4/6/2018) 1 Device 0     OTC products: advil prn     No Known Allergies   Latex Allergy: NO    Social History   Substance Use Topics     Smoking status: Never Smoker     Smokeless tobacco: Never Used     Alcohol use 0.6 oz/week     1 Standard drinks or equivalent per week      Comment: occ wine     History   Drug Use No       REVIEW OF SYSTEMS:   Constitutional, HEENT, cardiovascular, pulmonary, gi, heme, and psych systems are negative, except as otherwise noted.    EXAM:   /80 (BP Location: Left arm, Patient Position: Chair, Cuff Size: Adult Regular)  Pulse 76  LMP 03/19/2018    Gen: well appearing, pleasant woman, no distress  HEENT: PERRL, sclera nonicteric, oropharynx clear, MMM  Neck: supple, no LAD  Pulm: breathing comfortably, CTAB, no wheezes or rales  CV: RRR, normal S1 and S2, no murmurs  Abd: BS present, soft, nontender, nondistended  Ext: 2+ distal pulses, no LE edema       DIAGNOSTICS:   EKG: Not indicated due to non-vascular surgery and low risk of event (age <65 and without cardiac risk factors)    Recent Labs   Lab Test  11/06/12   1553  08/09/12   0812   HGB  13.8  15.0   PLT  198   --    NA  144   --    POTASSIUM  4.2   --    CR  0.70   --         IMPRESSION:   Reason for surgery/procedure: right inguinal hernia   Diagnosis/reason for consult: pre-op    The proposed surgical procedure is considered INTERMEDIATE risk.    REVISED CARDIAC RISK INDEX  The patient has the following serious cardiovascular risks for perioperative complications such as (MI, PE, VFib and 3  AV Block):  No serious cardiac risks  INTERPRETATION: 0 risks: Class I (very low risk - 0.4% complication rate)    The patient has the following additional risks for perioperative complications:  No identified additional risks      ICD-10-CM    1. Preop general physical exam Z01.818    2.  Right inguinal hernia K40.90    3. Recurrent major depressive disorder, in partial remission (H) F33.41    4. Panic attacks F41.0 ALPRAZolam (XANAX) 0.25 MG tablet       RECOMMENDATIONS:       APPROVAL GIVEN to proceed with proposed procedure, without further diagnostic evaluation       Signed Electronically by: Mira Buck MD    Copy of this evaluation report is provided to requesting physician.    [place] Preop Guidelines    Revised Cardiac Risk Index

## 2018-04-27 ENCOUNTER — TELEPHONE (OUTPATIENT)
Dept: SURGERY | Facility: CLINIC | Age: 48
End: 2018-04-27

## 2018-04-27 NOTE — TELEPHONE ENCOUNTER
Per SEW, patient needs to reschedule hernia repair for a time after the tooth extraction and course of antibiotics are complete.    Jennifer in Surgery scheduling will call patient to reschedule.    Jonelle Brock RN

## 2018-04-27 NOTE — TELEPHONE ENCOUNTER
Name of caller: Patient    Reason for Call:  Scheduled for [facility] 4/30/18 and is currently having tooth pain and scheduled 5/3 for tooth extraction.  Wondering if she could start a prescribed antibiotic and advil this weekend or should she hold off until after surgery on Monday?    Surgeon:  Dr. Powell    Recent Surgery:  No    If yes, when & what type:  N/A      Best phone number to reach pt at is: [phone]  Ok to leave a message with medical info? Yes.    Pharmacy preferred (if calling for a refill): N/A

## 2018-04-27 NOTE — TELEPHONE ENCOUNTER
Patient is scheduled for laparoscopic right inguinal hernia repair with mesh on 4/30/18 with Dr. Powell.    She called today reporting that she is having issues with a tooth and is scheduled to have it extracted on May 3rd. She is wondering if she should start antibiotics and advil for this now or if she should wait until after the surgeyr.    Called patient and left message instructing her that if she has an active infection, she should start the antibiotic and use advil sparingly.  Also informed her that this will be discussed with Dr. Powell today and that patient will receive a phone call with recommendations.    Jonelle Brock RN

## 2018-04-30 ENCOUNTER — APPOINTMENT (OUTPATIENT)
Dept: SURGERY | Facility: PHYSICIAN GROUP | Age: 48
End: 2018-04-30
Payer: COMMERCIAL

## 2018-04-30 ENCOUNTER — ANESTHESIA EVENT (OUTPATIENT)
Dept: SURGERY | Facility: CLINIC | Age: 48
End: 2018-04-30
Payer: COMMERCIAL

## 2018-04-30 ENCOUNTER — HOSPITAL ENCOUNTER (OUTPATIENT)
Facility: CLINIC | Age: 48
Discharge: HOME | End: 2018-04-30
Attending: SURGERY | Admitting: SURGERY
Payer: COMMERCIAL

## 2018-04-30 ENCOUNTER — ANESTHESIA (OUTPATIENT)
Dept: SURGERY | Facility: CLINIC | Age: 48
End: 2018-04-30
Payer: COMMERCIAL

## 2018-04-30 ENCOUNTER — SURGERY (OUTPATIENT)
Age: 48
End: 2018-04-30

## 2018-04-30 VITALS
TEMPERATURE: 98.1 F | WEIGHT: 125 LBS | OXYGEN SATURATION: 96 % | BODY MASS INDEX: 21.34 KG/M2 | RESPIRATION RATE: 13 BRPM | SYSTOLIC BLOOD PRESSURE: 101 MMHG | DIASTOLIC BLOOD PRESSURE: 64 MMHG | HEIGHT: 64 IN

## 2018-04-30 DIAGNOSIS — G89.18 ACUTE POST-OPERATIVE PAIN: Primary | ICD-10-CM

## 2018-04-30 LAB — HCG UR QL: NEGATIVE

## 2018-04-30 PROCEDURE — 71000012 ZZH RECOVERY PHASE 1 LEVEL 1 FIRST HR: Performed by: SURGERY

## 2018-04-30 PROCEDURE — 37000008 ZZH ANESTHESIA TECHNICAL FEE, 1ST 30 MIN: Performed by: SURGERY

## 2018-04-30 PROCEDURE — 36000056 ZZH SURGERY LEVEL 3 1ST 30 MIN: Performed by: SURGERY

## 2018-04-30 PROCEDURE — 25000128 H RX IP 250 OP 636: Performed by: ANESTHESIOLOGY

## 2018-04-30 PROCEDURE — C1781 MESH (IMPLANTABLE): HCPCS | Performed by: SURGERY

## 2018-04-30 PROCEDURE — 81025 URINE PREGNANCY TEST: CPT | Performed by: ANESTHESIOLOGY

## 2018-04-30 PROCEDURE — 25000125 ZZHC RX 250: Performed by: ANESTHESIOLOGY

## 2018-04-30 PROCEDURE — 71000027 ZZH RECOVERY PHASE 2 EACH 15 MINS: Performed by: SURGERY

## 2018-04-30 PROCEDURE — 40000170 ZZH STATISTIC PRE-PROCEDURE ASSESSMENT II: Performed by: SURGERY

## 2018-04-30 PROCEDURE — 49650 LAP ING HERNIA REPAIR INIT: CPT | Performed by: SURGERY

## 2018-04-30 PROCEDURE — 25000125 ZZHC RX 250: Performed by: SURGERY

## 2018-04-30 PROCEDURE — 27210995 ZZH RX 272: Performed by: SURGERY

## 2018-04-30 PROCEDURE — 25000132 ZZH RX MED GY IP 250 OP 250 PS 637: Performed by: PHYSICIAN ASSISTANT

## 2018-04-30 PROCEDURE — 27210794 ZZH OR GENERAL SUPPLY STERILE: Performed by: SURGERY

## 2018-04-30 PROCEDURE — 37000009 ZZH ANESTHESIA TECHNICAL FEE, EACH ADDTL 15 MIN: Performed by: SURGERY

## 2018-04-30 PROCEDURE — 49650 LAP ING HERNIA REPAIR INIT: CPT | Mod: AS | Performed by: PHYSICIAN ASSISTANT

## 2018-04-30 PROCEDURE — 36000058 ZZH SURGERY LEVEL 3 EA 15 ADDTL MIN: Performed by: SURGERY

## 2018-04-30 PROCEDURE — 25000566 ZZH SEVOFLURANE, EA 15 MIN: Performed by: SURGERY

## 2018-04-30 PROCEDURE — 71000013 ZZH RECOVERY PHASE 1 LEVEL 1 EA ADDTL HR: Performed by: SURGERY

## 2018-04-30 PROCEDURE — 25000128 H RX IP 250 OP 636: Performed by: NURSE ANESTHETIST, CERTIFIED REGISTERED

## 2018-04-30 PROCEDURE — 25000125 ZZHC RX 250: Performed by: NURSE ANESTHETIST, CERTIFIED REGISTERED

## 2018-04-30 DEVICE — MESH PROGRIP LAPAROSCOPIC 5.9X3.9" PARIETEX SELF-FIX LPG1510: Type: IMPLANTABLE DEVICE | Site: GROIN | Status: FUNCTIONAL

## 2018-04-30 RX ORDER — OXYCODONE AND ACETAMINOPHEN 5; 325 MG/1; MG/1
1 TABLET ORAL
Status: CANCELLED | OUTPATIENT
Start: 2018-04-30

## 2018-04-30 RX ORDER — AMOXICILLIN 250 MG
1-2 CAPSULE ORAL 2 TIMES DAILY
Qty: 15 TABLET | Refills: 0 | Status: SHIPPED | OUTPATIENT
Start: 2018-04-30 | End: 2018-07-10

## 2018-04-30 RX ORDER — PROPOFOL 10 MG/ML
INJECTION, EMULSION INTRAVENOUS PRN
Status: DISCONTINUED | OUTPATIENT
Start: 2018-04-30 | End: 2018-04-30

## 2018-04-30 RX ORDER — SODIUM CHLORIDE, SODIUM LACTATE, POTASSIUM CHLORIDE, CALCIUM CHLORIDE 600; 310; 30; 20 MG/100ML; MG/100ML; MG/100ML; MG/100ML
INJECTION, SOLUTION INTRAVENOUS CONTINUOUS
Status: DISCONTINUED | OUTPATIENT
Start: 2018-04-30 | End: 2018-04-30 | Stop reason: HOSPADM

## 2018-04-30 RX ORDER — CEFAZOLIN SODIUM 1 G/3ML
1 INJECTION, POWDER, FOR SOLUTION INTRAMUSCULAR; INTRAVENOUS SEE ADMIN INSTRUCTIONS
Status: DISCONTINUED | OUTPATIENT
Start: 2018-04-30 | End: 2018-04-30 | Stop reason: HOSPADM

## 2018-04-30 RX ORDER — ONDANSETRON 2 MG/ML
4 INJECTION INTRAMUSCULAR; INTRAVENOUS EVERY 30 MIN PRN
Status: DISCONTINUED | OUTPATIENT
Start: 2018-04-30 | End: 2018-04-30 | Stop reason: HOSPADM

## 2018-04-30 RX ORDER — ONDANSETRON 4 MG/1
4 TABLET, ORALLY DISINTEGRATING ORAL EVERY 30 MIN PRN
Status: DISCONTINUED | OUTPATIENT
Start: 2018-04-30 | End: 2018-04-30 | Stop reason: HOSPADM

## 2018-04-30 RX ORDER — NALOXONE HYDROCHLORIDE 0.4 MG/ML
.1-.4 INJECTION, SOLUTION INTRAMUSCULAR; INTRAVENOUS; SUBCUTANEOUS
Status: DISCONTINUED | OUTPATIENT
Start: 2018-04-30 | End: 2018-04-30 | Stop reason: HOSPADM

## 2018-04-30 RX ORDER — FENTANYL CITRATE 50 UG/ML
INJECTION, SOLUTION INTRAMUSCULAR; INTRAVENOUS PRN
Status: DISCONTINUED | OUTPATIENT
Start: 2018-04-30 | End: 2018-04-30

## 2018-04-30 RX ORDER — MEPERIDINE HYDROCHLORIDE 25 MG/ML
12.5 INJECTION INTRAMUSCULAR; INTRAVENOUS; SUBCUTANEOUS
Status: DISCONTINUED | OUTPATIENT
Start: 2018-04-30 | End: 2018-04-30 | Stop reason: HOSPADM

## 2018-04-30 RX ORDER — PHYSOSTIGMINE SALICYLATE 1 MG/ML
1.2 INJECTION INTRAVENOUS
Status: DISCONTINUED | OUTPATIENT
Start: 2018-04-30 | End: 2018-04-30 | Stop reason: HOSPADM

## 2018-04-30 RX ORDER — SODIUM CHLORIDE, SODIUM LACTATE, POTASSIUM CHLORIDE, CALCIUM CHLORIDE 600; 310; 30; 20 MG/100ML; MG/100ML; MG/100ML; MG/100ML
INJECTION, SOLUTION INTRAVENOUS CONTINUOUS PRN
Status: DISCONTINUED | OUTPATIENT
Start: 2018-04-30 | End: 2018-04-30

## 2018-04-30 RX ORDER — HYDROCODONE BITARTRATE AND ACETAMINOPHEN 5; 325 MG/1; MG/1
1-2 TABLET ORAL EVERY 4 HOURS PRN
Qty: 20 TABLET | Refills: 0 | Status: SHIPPED | OUTPATIENT
Start: 2018-04-30 | End: 2018-06-12

## 2018-04-30 RX ORDER — ONDANSETRON 2 MG/ML
INJECTION INTRAMUSCULAR; INTRAVENOUS PRN
Status: DISCONTINUED | OUTPATIENT
Start: 2018-04-30 | End: 2018-04-30

## 2018-04-30 RX ORDER — DEXAMETHASONE SODIUM PHOSPHATE 4 MG/ML
INJECTION, SOLUTION INTRA-ARTICULAR; INTRALESIONAL; INTRAMUSCULAR; INTRAVENOUS; SOFT TISSUE PRN
Status: DISCONTINUED | OUTPATIENT
Start: 2018-04-30 | End: 2018-04-30

## 2018-04-30 RX ORDER — HYDROCODONE BITARTRATE AND ACETAMINOPHEN 5; 325 MG/1; MG/1
1 TABLET ORAL
Status: COMPLETED | OUTPATIENT
Start: 2018-04-30 | End: 2018-04-30

## 2018-04-30 RX ORDER — NEOSTIGMINE METHYLSULFATE 1 MG/ML
VIAL (ML) INJECTION PRN
Status: DISCONTINUED | OUTPATIENT
Start: 2018-04-30 | End: 2018-04-30

## 2018-04-30 RX ORDER — FENTANYL CITRATE 50 UG/ML
25-50 INJECTION, SOLUTION INTRAMUSCULAR; INTRAVENOUS EVERY 5 MIN PRN
Status: DISCONTINUED | OUTPATIENT
Start: 2018-04-30 | End: 2018-04-30 | Stop reason: HOSPADM

## 2018-04-30 RX ORDER — CEFAZOLIN SODIUM 2 G/100ML
2 INJECTION, SOLUTION INTRAVENOUS
Status: DISCONTINUED | OUTPATIENT
Start: 2018-04-30 | End: 2018-04-30 | Stop reason: HOSPADM

## 2018-04-30 RX ORDER — FENTANYL CITRATE 50 UG/ML
25-50 INJECTION, SOLUTION INTRAMUSCULAR; INTRAVENOUS
Status: DISCONTINUED | OUTPATIENT
Start: 2018-04-30 | End: 2018-04-30 | Stop reason: HOSPADM

## 2018-04-30 RX ORDER — OXYCODONE AND ACETAMINOPHEN 5; 325 MG/1; MG/1
1-2 TABLET ORAL EVERY 4 HOURS PRN
Qty: 20 TABLET | Refills: 0 | Status: SHIPPED | OUTPATIENT
Start: 2018-04-30 | End: 2018-04-30

## 2018-04-30 RX ORDER — PROPOFOL 10 MG/ML
INJECTION, EMULSION INTRAVENOUS CONTINUOUS PRN
Status: DISCONTINUED | OUTPATIENT
Start: 2018-04-30 | End: 2018-04-30

## 2018-04-30 RX ORDER — LIDOCAINE HYDROCHLORIDE 20 MG/ML
INJECTION, SOLUTION INFILTRATION; PERINEURAL PRN
Status: DISCONTINUED | OUTPATIENT
Start: 2018-04-30 | End: 2018-04-30

## 2018-04-30 RX ORDER — MAGNESIUM HYDROXIDE 1200 MG/15ML
LIQUID ORAL PRN
Status: DISCONTINUED | OUTPATIENT
Start: 2018-04-30 | End: 2018-04-30 | Stop reason: HOSPADM

## 2018-04-30 RX ORDER — KETOROLAC TROMETHAMINE 30 MG/ML
30 INJECTION, SOLUTION INTRAMUSCULAR; INTRAVENOUS ONCE
Status: COMPLETED | OUTPATIENT
Start: 2018-04-30 | End: 2018-04-30

## 2018-04-30 RX ORDER — GLYCOPYRROLATE 0.2 MG/ML
INJECTION, SOLUTION INTRAMUSCULAR; INTRAVENOUS PRN
Status: DISCONTINUED | OUTPATIENT
Start: 2018-04-30 | End: 2018-04-30

## 2018-04-30 RX ADMIN — FENTANYL CITRATE 50 MCG: 50 INJECTION, SOLUTION INTRAMUSCULAR; INTRAVENOUS at 10:16

## 2018-04-30 RX ADMIN — LIDOCAINE HYDROCHLORIDE 60 MG: 20 INJECTION, SOLUTION INFILTRATION; PERINEURAL at 10:00

## 2018-04-30 RX ADMIN — NEOSTIGMINE METHYLSULFATE 2 MG: 1 INJECTION, SOLUTION INTRAVENOUS at 11:05

## 2018-04-30 RX ADMIN — MIDAZOLAM 2 MG: 1 INJECTION INTRAMUSCULAR; INTRAVENOUS at 10:00

## 2018-04-30 RX ADMIN — BUPIVACAINE HYDROCHLORIDE AND EPINEPHRINE 10 ML: 5; 5 INJECTION, SOLUTION EPIDURAL; INTRACAUDAL; PERINEURAL at 11:03

## 2018-04-30 RX ADMIN — ONDANSETRON 4 MG: 4 TABLET, ORALLY DISINTEGRATING ORAL at 13:26

## 2018-04-30 RX ADMIN — PROPOFOL 30 MG: 10 INJECTION, EMULSION INTRAVENOUS at 10:17

## 2018-04-30 RX ADMIN — DEXMEDETOMIDINE HYDROCHLORIDE 4 MCG: 100 INJECTION, SOLUTION INTRAVENOUS at 10:05

## 2018-04-30 RX ADMIN — ROCURONIUM BROMIDE 20 MG: 10 INJECTION INTRAVENOUS at 10:00

## 2018-04-30 RX ADMIN — PROPOFOL 200 MCG/KG/MIN: 10 INJECTION, EMULSION INTRAVENOUS at 10:00

## 2018-04-30 RX ADMIN — DEXMEDETOMIDINE HYDROCHLORIDE 4 MCG: 100 INJECTION, SOLUTION INTRAVENOUS at 10:06

## 2018-04-30 RX ADMIN — DEXMEDETOMIDINE HYDROCHLORIDE 4 MCG: 100 INJECTION, SOLUTION INTRAVENOUS at 10:07

## 2018-04-30 RX ADMIN — FENTANYL CITRATE 50 MCG: 50 INJECTION, SOLUTION INTRAMUSCULAR; INTRAVENOUS at 11:51

## 2018-04-30 RX ADMIN — ONDANSETRON 4 MG: 2 INJECTION INTRAMUSCULAR; INTRAVENOUS at 11:03

## 2018-04-30 RX ADMIN — GLYCOPYRROLATE 0.4 MG: 0.2 INJECTION, SOLUTION INTRAMUSCULAR; INTRAVENOUS at 11:05

## 2018-04-30 RX ADMIN — BUPIVACAINE HYDROCHLORIDE AND EPINEPHRINE 20 ML: 5; 5 INJECTION, SOLUTION EPIDURAL; INTRACAUDAL; PERINEURAL at 10:15

## 2018-04-30 RX ADMIN — ROCURONIUM BROMIDE 5 MG: 10 INJECTION INTRAVENOUS at 10:16

## 2018-04-30 RX ADMIN — SODIUM CHLORIDE, POTASSIUM CHLORIDE, SODIUM LACTATE AND CALCIUM CHLORIDE: 600; 310; 30; 20 INJECTION, SOLUTION INTRAVENOUS at 09:59

## 2018-04-30 RX ADMIN — FENTANYL CITRATE 50 MCG: 50 INJECTION, SOLUTION INTRAMUSCULAR; INTRAVENOUS at 10:00

## 2018-04-30 RX ADMIN — KETOROLAC TROMETHAMINE 30 MG: 30 INJECTION, SOLUTION INTRAMUSCULAR at 11:52

## 2018-04-30 RX ADMIN — DEXAMETHASONE SODIUM PHOSPHATE 4 MG: 4 INJECTION, SOLUTION INTRA-ARTICULAR; INTRALESIONAL; INTRAMUSCULAR; INTRAVENOUS; SOFT TISSUE at 10:58

## 2018-04-30 RX ADMIN — PROPOFOL 200 MG: 10 INJECTION, EMULSION INTRAVENOUS at 10:00

## 2018-04-30 RX ADMIN — HYDROCODONE BITARTRATE AND ACETAMINOPHEN 1 TABLET: 5; 325 TABLET ORAL at 12:24

## 2018-04-30 RX ADMIN — SODIUM CHLORIDE 1000 ML: 900 IRRIGANT IRRIGATION at 10:18

## 2018-04-30 RX ADMIN — FENTANYL CITRATE 50 MCG: 50 INJECTION, SOLUTION INTRAMUSCULAR; INTRAVENOUS at 11:01

## 2018-04-30 NOTE — IP AVS SNAPSHOT
MRN:[id number]                      After Visit Summary   4/30/2018    Heber Rosales    MRN: [id number]           Thank you!     Thank you for choosing [facility] for your care. Our goal is always to provide you with excellent care. Hearing back from our patients is one way we can continue to improve our services. Please take a few minutes to complete the written survey that you may receive in the mail after you visit with us. Thank you!        Patient Information     Date Of Birth          1970        About your hospital stay     You were admitted on:  April 30, 2018 You last received care in the:  [facility]    You were discharged on:  April 30, 2018       Who to Call     For medical emergencies, please call 911.  For non-urgent questions about your medical care, please call your primary care provider or clinic, [phone]  For questions related to your surgery, please call your surgery clinic        Attending Provider     Provider Specialty    Nuha Powell MD Surgery       Primary Care Provider Office Phone # Fax #    Mira Buck [phone]      Further instructions from your care team       [facility]  Discharge Instructions: Post-Operative Laparoscopic Inguinal Hernia    ACTIVITY    Take frequent, short walks and increase your activity gradually.      Avoid strenuous physical activity or heavy lifting greater than 15 lbs. for 3-4 weeks.  You may climb stairs.    You may drive without restrictions when you are not using any prescription pain medication and feel comfortable in a car.    You may return to work/school when you are comfortable without any prescription pain medication.    WOUND CARE    You may remove your outer dressing or Band-Aids and shower 48 hours after the surgery.  Pat your incisions dry and leave them open to air.  Re-apply dressing (Band-Aids or gauze/tape) as  needed for comfort or drainage.    You may have steri-strips (looks like white tape) on your incision.  You may peel off the steri-strips 2 weeks after your surgery if they have not peeled off on their own.     Do not soak your incisions in a tub or pool for 2 weeks.     Do not apply any lotions, creams, or ointments to your incisions.    A ridge under your incisions is normal and will gradually resolve.    DIET    Start with liquids, then gradually resume your regular diet as tolerated.     Drink plenty of fluids to stay hydrated.    PAIN    Expect some tenderness and discomfort at the incision site(s).  Use the prescribed pain medication at your discretion.  Expect gradual resolution of your pain over several days.    You may take ibuprofen with food (unless you have been told not to) instead of or in addition to your prescribed pain medication.  If you are taking Norco or Percocet, do not take any additional acetaminophen/APAP/Tylenol.    Do not drink alcohol or drive while you are taking pain medications.    You may apply ice to your incisions in 20 minute intervals as needed for the next 48 hours.  After that time, consider switching to heat if you prefer.    EXPECTATIONS    You may notice air in your scrotum and/or penis after the surgery.  This is due to the gas used during the surgery.  You can expect some swelling and bruising involving the scrotum and/or penis as well as numbness.  These symptoms are expected and should gradually resolve in the next few days.  You may use ice to help with the swelling and try placing a rolled hand towel below your scrotum to help alleviate scrotal discomfort.  If you develop significant testicular or penile pain, please call our office and speak with a nurse.    Pain medications can cause constipation.  Limit use when possible.  Take over the counter stool softener/stimulant, such as Colace or Senna, 1-2 times a day with plenty of water.  You may take a mild over the  counter laxative, such as Miralax or a suppository, as needed.  You may take 1 oz. (2 tablespoons) Milk of Magnesia the evening following surgery to encourage bowel movement.  You may discontinue these medications once you are having regular bowel movements and/or are no longer taking your narcotic pain medication.     You may have shoulder or upper back discomfort due to the gas used in surgery.  This is temporary and should resolve in 48-72 hours.  Short, frequent walks may help with this.    FOLLOW UP    Our office will contact you approximately 2 weeks to check on your progress and answer any questions you may have.  If you are doing well, you will not need to return for a follow up appointment.  If any concerns are identified over the phone, we will help you make an appointment to see a provider.     If you have not received a phone call, have any questions or concerns, or would like to be seen, please call us at [phone] and ask to speak with our nurse.  We are located at [address] [place] [address].    CALL OUR OFFICE [phone] IF YOU HAVE:     Chills or fever above 101 F.    Increased redness, warmth, or drainage at your incisions.    Significant bleeding.    Pain not relieved by your pain medication or rest.    Increasing pain after the first 48 hours.    Any other concerns or questions.    Revised January 2018      Today you received Toradol, an antiinflammatory medication similar to Ibuprofen.  You should not take other antiinflammatory medication, such as Ibuprofen, Motrin, Advil, Aleve, Naprosyn, etc, until 600 pm    . Same Day Surgery Discharge Instructions for  Sedation and General Anesthesia       It's not unusual to feel dizzy, light-headed or faint for up to 24 hours after surgery or while taking pain medication.  If you have these symptoms: sit for a few minutes before standing and have someone assist you when you get up to walk or use the bathroom.      You  "should rest and relax for the next 24 hours. We recommend you make arrangements to have an adult stay with you for at least 24 hours after your discharge.  Avoid hazardous and strenuous activity.      DO NOT DRIVE any vehicle or operate mechanical equipment for 24 hours following the end of your surgery.  Even though you may feel normal, your reactions may be affected by the medication you have received.      Do not drink alcoholic beverages for 24 hours following surgery.       Slowly progress to your regular diet as you feel able. It's not unusual to feel nauseated and/or vomit after receiving anesthesia.  If you develop these symptoms, drink clear liquids (apple juice, ginger ale, broth, 7-up, etc. ) until you feel better.  If your nausea and vomiting persists for 24 hours, please notify your surgeon.        All narcotic pain medications, along with inactivity and anesthesia, can cause constipation. Drinking plenty of liquids and increasing fiber intake will help.      For any questions of a medical nature, call your surgeon.      Do not make important decisions for 24 hours.      If you had general anesthesia, you may have a sore throat for a couple of days related to the breathing tube used during surgery.  You may use Cepacol lozenges to help with this discomfort.  If it worsens or if you develop a fever, contact your surgeon.       If you feel your pain is not well managed with the pain medications prescribed by your surgeon, please contact your surgeon's office to let them know so they can address your concerns.           Pending Results     No orders found from 4/28/2018 to 5/1/2018.            Admission Information     Date & Time Provider Department Dept. Phone    4/30/2018 Nuha Powell MD [facility] Same Day Surgery [phone]      Your Vitals Were     Blood Pressure Temperature Respirations Height Weight Last Period    97/63 98.1  F (36.7  C) (Temporal) 14 1.626 m (5' 4\") 56.7 kg (125 lb) " 04/23/2018    Pulse Oximetry BMI (Body Mass Index)                99% 21.46 kg/m2          Exosect Information     [employer] gives you secure access to your electronic health record. If you see a primary care provider, you can also send messages to your care team and make appointments. If you have questions, please call your primary care clinic.  If you do not have a primary care provider, please call [phone] and they will assist you.        Care EveryWhere ID     This is your Care EveryWhere ID. This could be used by other organizations to access your [facility] medical records  [id number]        Equal Access to Services     [facility]: Hadii abner Novoa, ferny pacheco, harrison vasquez, jocelyn cox. So [facility] [phone].    ATENCIÓN: Si habla español, tiene a hansen disposición servicios gratuitos de asistencia lingüística. Llame al [phone].    We comply with applicable federal civil rights laws and [place] laws. We do not discriminate on the basis of race, color, national origin, age, disability, sex, sexual orientation, or gender identity.               Review of your medicines      START taking        Dose / Directions    HYDROcodone-acetaminophen 5-325 MG per tablet   Commonly known as:  NORCO   Used for:  Acute post-operative pain   Notes to Patient:  One pain pill taken at 12:30 pm.        Dose:  1-2 tablet   Take 1-2 tablets by mouth every 4 hours as needed for pain maximum 10 tablet(s) per day   Quantity:  20 tablet   Refills:  0       senna-docusate 8.6-50 MG per tablet   Commonly known as:  SENOKOT-S;PERICOLACE   Used for:  Acute post-operative pain        Dose:  1-2 tablet   Take 1-2 tablets by mouth 2 times daily Take while on oral narcotics to prevent or treat constipation.   Quantity:  15 tablet   Refills:  0         CONTINUE these medicines which may have CHANGED, or have new prescriptions. If we are uncertain of the size of  tablets/capsules you have at home, strength may be listed as something that might have changed.        Dose / Directions    sertraline 100 MG tablet   Commonly known as:  ZOLOFT   This may have changed:  See the new instructions.   Used for:  Recurrent major depressive disorder, in partial remission (H)        TAKE 1 1/2 TABLETS BY MOUTH DAILY   Quantity:  135 tablet   Refills:  1         CONTINUE these medicines which have NOT CHANGED        Dose / Directions    ALPRAZolam 0.25 MG tablet   Commonly known as:  XANAX   Used for:  Panic attacks        Dose:  0.25 mg   Take 1 tablet (0.25 mg) by mouth 2 times daily as needed for anxiety   Quantity:  30 tablet   Refills:  0       AMOXICILLIN PO        Dose:  500 mg   Take 500 mg by mouth 3 times daily   Refills:  0       IBUPROFEN PO   Notes to Patient:  TOOK TORADOL AT 1152, MAY TAKE IBUPROFEN AT 6 PM        Dose:  600 mg   Take 600 mg by mouth every 6 hours as needed for moderate pain   Refills:  0       order for DME   Used for:  Right wrist pain        Equipment being ordered: wrist thumb right   Quantity:  1 Device   Refills:  0       VITAMIN D (CHOLECALCIFEROL) PO        Dose:  1000 Units   Take 1,000 Units by mouth daily   Refills:  0            Where to get your medicines      These medications were sent to [facility] [place], [place] - [address]  [address] [facility], [place] [address]     Phone:  [phone]     senna-docusate 8.6-50 MG per tablet         Some of these will need a paper prescription and others can be bought over the counter. Ask your nurse if you have questions.     Bring a paper prescription for each of these medications     HYDROcodone-acetaminophen 5-325 MG per tablet                Protect others around you: Learn how to safely use, store and throw away your medicines at www.disposemymeds.org.        ANTIBIOTIC INSTRUCTION     You've Been Prescribed an Antibiotic - Now What?  Your healthcare team thinks that you or  your loved one might have an infection. Some infections can be treated with antibiotics, which are powerful, life-saving drugs. Like all medications, antibiotics have side effects and should only be used when necessary. There are some important things you should know about your antibiotic treatment.      Your healthcare team may run tests before you start taking an antibiotic.    Your team may take samples (e.g., from your blood, urine or other areas) to run tests to look for bacteria. These test can be important to determine if you need an antibiotic at all and, if you do, which antibiotic will work best.      Within a few days, your healthcare team might change or even stop your antibiotic.    Your team may start you on an antibiotic while they are working to find out what is making you sick.    Your team might change your antibiotic because test results show that a different antibiotic would be better to treat your infection.    In some cases, once your team has more information, they learn that you do not need an antibiotic at all. They may find out that you don't have an infection, or that the antibiotic you're taking won't work against your infection. For example, an infection caused by a virus can't be treated with antibiotics. Staying on an antibiotic when you don't need it is more likely to be harmful than helpful.      You may experience side effects from your antibiotic.    Like all medications, antibiotics have side effects. Some of these can be serious.    Let you healthcare team know if you have any known allergies when you are admitted to the hospital.    One significant side effect of nearly all antibiotics is the risk of severe and sometimes deadly diarrhea caused by Clostridium difficile (C. Difficile). This occurs when a person takes antibiotics because some good germs are destroyed. Antibiotic use allows C. diificile to take over, putting patients at high risk for this serious infection.    As a  patient or caregiver, it is important to understand your or your loved one's antibiotic treatment. It is especially important for caregivers to speak up when patients can't speak for themselves. Here are some important questions to ask your healthcare team.    What infection is this antibiotic treating and how do you know I have that infection?    What side effects might occur from this antibiotic?    How long will I need to take this antibiotic?    Is it safe to take this antibiotic with other medications or supplements (e.g., vitamins) that I am taking?     Are there any special directions I need to know about taking this antibiotic? For example, should I take it with food?    How will I be monitored to know whether my infection is responding to the antibiotic?    What tests may help to make sure the right antibiotic is prescribed for me?      Information provided by:  www.cdc.gov/getsmart  U.S. Department of Health and Human Services  Centers for disease Control and Prevention  National Center for Emerging and Zoonotic Infectious Diseases  Division of Healthcare Quality Promotion        Information about OPIOIDS     PRESCRIPTION OPIOIDS: WHAT YOU NEED TO KNOW   You have a prescription for an opioid (narcotic) pain medicine. Opioids can cause addiction. If you have a history of chemical dependency of any type, you are at a higher risk of becoming addicted to opioids. Only take this medicine after all other options have been tried. Take it for as short a time and as few doses as possible.     Do not:    Drive. If you drive while taking these medicines, you could be arrested for driving under the influence (DUI).    Operate heavy machinery    Do any other dangerous activities while taking these medicines.     Drink any alcohol while taking these medicines.      Take with any other medicines that contain acetaminophen. Read all labels carefully. Look for the word  acetaminophen  or  Tylenol.  Ask your pharmacist if  you have questions or are unsure.    Store your pills in a secure place, locked if possible. We will not replace any lost or stolen medicine. If you don t finish your medicine, please throw away (dispose) as directed by your pharmacist. The [place] Pollution Control Agency has more information about safe disposal: https://www.pca.[facility].[place]/living-green/managing-unwanted-medications    All opioids tend to cause constipation. Drink plenty of water and eat foods that have a lot of fiber, such as fruits, vegetables, prune juice, apple juice and high-fiber cereal. Take a laxative (Miralax, milk of magnesia, Colace, Senna) if you don t move your bowels at least every other day.              Medication List: This is a list of all your medications and when to take them. Check marks below indicate your daily home schedule. Keep this list as a reference.      Medications           Morning Afternoon Evening Bedtime As Needed    ALPRAZolam 0.25 MG tablet   Commonly known as:  XANAX   Take 1 tablet (0.25 mg) by mouth 2 times daily as needed for anxiety                                AMOXICILLIN PO   Take 500 mg by mouth 3 times daily                                HYDROcodone-acetaminophen 5-325 MG per tablet   Commonly known as:  NORCO   Take 1-2 tablets by mouth every 4 hours as needed for pain maximum 10 tablet(s) per day   Last time this was given:  1 tablet on 4/30/2018 12:24 PM   Notes to Patient:  One pain pill taken at 12:30 pm.                                IBUPROFEN PO   Take 600 mg by mouth every 6 hours as needed for moderate pain   Notes to Patient:  TOOK TORADOL AT 1152, MAY TAKE IBUPROFEN AT 6 PM                                order for DME   Equipment being ordered: wrist thumb right                                senna-docusate 8.6-50 MG per tablet   Commonly known as:  SENOKOT-S;PERICOLACE   Take 1-2 tablets by mouth 2 times daily Take while on oral narcotics to prevent or treat constipation.                                 sertraline 100 MG tablet   Commonly known as:  ZOLOFT   TAKE 1 1/2 TABLETS BY MOUTH DAILY                                VITAMIN D (CHOLECALCIFEROL) PO   Take 1,000 Units by mouth daily

## 2018-04-30 NOTE — ANESTHESIA PREPROCEDURE EVALUATION
Anesthesia Evaluation     . Pt has had prior anesthetic.     History of anesthetic complications   - PONV        ROS/MED HX    ENT/Pulmonary:       Neurologic:       Cardiovascular:         METS/Exercise Tolerance:     Hematologic:         Musculoskeletal:         GI/Hepatic:         Renal/Genitourinary:         Endo:         Psychiatric:     (+) psychiatric history (Panic attacks) depression and anxiety      Infectious Disease:         Malignancy:         Other:                     Physical Exam  Normal systems: cardiovascular and pulmonary    Airway   Mallampati: I  TM distance: >3 FB  Neck ROM: full    Dental     Cardiovascular       Pulmonary                     Anesthesia Plan      History & Physical Review  History and physical reviewed and following examination; no interval change.    ASA Status:  1 .    NPO Status:  > 8 hours    Plan for General and ETT with Intravenous and Propofol induction. Maintenance will be TIVA.    PONV prophylaxis:  Ondansetron (or other 5HT-3) and Dexamethasone or Solumedrol       Postoperative Care  Postoperative pain management:  IV analgesics and Oral pain medications.      Consents  Anesthetic plan, risks, benefits and alternatives discussed with:  Patient..        DPreop diagnosis: RIGHT INGUINAL HERNIA  Procedure(s):  LAPAROSCOPIC HERNIORRHAPHY INGUINAL  No Known Allergies    No current facility-administered medications on file prior to encounter.   Current Outpatient Prescriptions on File Prior to Encounter:  order for DME Equipment being ordered: wrist thumb right (Patient not taking: Reported on 4/6/2018)   sertraline (ZOLOFT) 100 MG tablet TAKE 1 1/2 TABLETS BY MOUTH DAILY (Patient taking differently: 50mg daily)

## 2018-04-30 NOTE — IP AVS SNAPSHOT
[facility]    [address]    [place] [address]    Phone:  [phone]    Fax:  [phone]                                       After Visit Summary   4/30/2018    Heber Rosales    MRN: [id number]           After Visit Summary Signature Page     I have received my discharge instructions, and my questions have been answered. I have discussed any challenges I see with this plan with the nurse or doctor.    ..........................................................................................................................................  Patient/Patient Representative Signature      ..........................................................................................................................................  Patient Representative Print Name and Relationship to Patient    ..................................................               ................................................  Date                                            Time    ..........................................................................................................................................  Reviewed by Signature/Title    ...................................................              ..............................................  Date                                                            Time

## 2018-04-30 NOTE — DISCHARGE INSTRUCTIONS
[facility]  Discharge Instructions: Post-Operative Laparoscopic Inguinal Hernia    ACTIVITY    Take frequent, short walks and increase your activity gradually.      Avoid strenuous physical activity or heavy lifting greater than 15 lbs. for 3-4 weeks.  You may climb stairs.    You may drive without restrictions when you are not using any prescription pain medication and feel comfortable in a car.    You may return to work/school when you are comfortable without any prescription pain medication.    WOUND CARE    You may remove your outer dressing or Band-Aids and shower 48 hours after the surgery.  Pat your incisions dry and leave them open to air.  Re-apply dressing (Band-Aids or gauze/tape) as needed for comfort or drainage.    You may have steri-strips (looks like white tape) on your incision.  You may peel off the steri-strips 2 weeks after your surgery if they have not peeled off on their own.     Do not soak your incisions in a tub or pool for 2 weeks.     Do not apply any lotions, creams, or ointments to your incisions.    A ridge under your incisions is normal and will gradually resolve.    DIET    Start with liquids, then gradually resume your regular diet as tolerated.     Drink plenty of fluids to stay hydrated.    PAIN    Expect some tenderness and discomfort at the incision site(s).  Use the prescribed pain medication at your discretion.  Expect gradual resolution of your pain over several days.    You may take ibuprofen with food (unless you have been told not to) instead of or in addition to your prescribed pain medication.  If you are taking Norco or Percocet, do not take any additional acetaminophen/APAP/Tylenol.    Do not drink alcohol or drive while you are taking pain medications.    You may apply ice to your incisions in 20 minute intervals as needed for the next 48 hours.  After that time, consider switching to heat if you prefer.    EXPECTATIONS    You  may notice air in your scrotum and/or penis after the surgery.  This is due to the gas used during the surgery.  You can expect some swelling and bruising involving the scrotum and/or penis as well as numbness.  These symptoms are expected and should gradually resolve in the next few days.  You may use ice to help with the swelling and try placing a rolled hand towel below your scrotum to help alleviate scrotal discomfort.  If you develop significant testicular or penile pain, please call our office and speak with a nurse.    Pain medications can cause constipation.  Limit use when possible.  Take over the counter stool softener/stimulant, such as Colace or Senna, 1-2 times a day with plenty of water.  You may take a mild over the counter laxative, such as Miralax or a suppository, as needed.  You may take 1 oz. (2 tablespoons) Milk of Magnesia the evening following surgery to encourage bowel movement.  You may discontinue these medications once you are having regular bowel movements and/or are no longer taking your narcotic pain medication.     You may have shoulder or upper back discomfort due to the gas used in surgery.  This is temporary and should resolve in 48-72 hours.  Short, frequent walks may help with this.    FOLLOW UP    Our office will contact you approximately 2 weeks to check on your progress and answer any questions you may have.  If you are doing well, you will not need to return for a follow up appointment.  If any concerns are identified over the phone, we will help you make an appointment to see a provider.     If you have not received a phone call, have any questions or concerns, or would like to be seen, please call us at [phone] and ask to speak with our nurse.  We are located at [address] [place] [address].    CALL OUR OFFICE -[phone] IF YOU HAVE:     Chills or fever above 101 F.    Increased redness, warmth, or drainage at your incisions.    Significant  bleeding.    Pain not relieved by your pain medication or rest.    Increasing pain after the first 48 hours.    Any other concerns or questions.    Revised January 2018      Today you received Toradol, an antiinflammatory medication similar to Ibuprofen.  You should not take other antiinflammatory medication, such as Ibuprofen, Motrin, Advil, Aleve, Naprosyn, etc, until 600 pm    . Same Day Surgery Discharge Instructions for  Sedation and General Anesthesia       It's not unusual to feel dizzy, light-headed or faint for up to 24 hours after surgery or while taking pain medication.  If you have these symptoms: sit for a few minutes before standing and have someone assist you when you get up to walk or use the bathroom.      You should rest and relax for the next 24 hours. We recommend you make arrangements to have an adult stay with you for at least 24 hours after your discharge.  Avoid hazardous and strenuous activity.      DO NOT DRIVE any vehicle or operate mechanical equipment for 24 hours following the end of your surgery.  Even though you may feel normal, your reactions may be affected by the medication you have received.      Do not drink alcoholic beverages for 24 hours following surgery.       Slowly progress to your regular diet as you feel able. It's not unusual to feel nauseated and/or vomit after receiving anesthesia.  If you develop these symptoms, drink clear liquids (apple juice, ginger ale, broth, 7-up, etc. ) until you feel better.  If your nausea and vomiting persists for 24 hours, please notify your surgeon.        All narcotic pain medications, along with inactivity and anesthesia, can cause constipation. Drinking plenty of liquids and increasing fiber intake will help.      For any questions of a medical nature, call your surgeon.      Do not make important decisions for 24 hours.      If you had general anesthesia, you may have a sore throat for a couple of days related to the breathing tube  used during surgery.  You may use Cepacol lozenges to help with this discomfort.  If it worsens or if you develop a fever, contact your surgeon.       If you feel your pain is not well managed with the pain medications prescribed by your surgeon, please contact your surgeon's office to let them know so they can address your concerns.

## 2018-04-30 NOTE — ANESTHESIA CARE TRANSFER NOTE
Patient: Heber Rosales    Procedure(s):  LAPAROSCOPIC RIGHT INGUINAL HERNIA REPAIR WITH MESH - Wound Class: I-Clean    Diagnosis: RIGHT INGUINAL HERNIA  Diagnosis Additional Information: No value filed.    Anesthesia Type:   General, ETT     Note:  Airway :Face Mask  Patient transferred to:PACU  Comments: 1121:  To par responsive, dentition unchanged from pre-op.Handoff Report: Identifed the Patient, Identified the Reponsible Provider, Reviewed the pertinent medical history, Discussed the surgical course, Reviewed Intra-OP anesthesia mangement and issues during anesthesia, Set expectations for post-procedure period and Allowed opportunity for questions and acknowledgement of understanding      Vitals: (Last set prior to Anesthesia Care Transfer)    CRNA VITALS  4/30/2018 1051 - 4/30/2018 1121      4/30/2018             Pulse: 81    Ht Rate: 82    SpO2: 95 %    Resp Rate (observed): 16    Resp Rate (set): 10                Electronically Signed By: HUSEYIN Royal CRNA  April 30, 2018  11:21 AM

## 2018-04-30 NOTE — BRIEF OP NOTE
General Surgery Brief Operative Note    Pre-operative diagnosis: RIGHT INGUINAL HERNIA   Post-operative diagnosis Same   Procedure: Procedure(s):  LAPAROSCOPIC RIGHT INGUINAL HERNIA REPAIR WITH MESH - Wound Class: I-Clean    Surgeon(s), Assistant(s): Surgeon(s) and Role:     * Nuha Powell MD - Primary     * Sandra Hayes PA-C - Assisting   Estimated blood loss: 5 mL   Drains: None   Specimens: * No specimens in log *   Findings: See postop diagnosis   Condition: Stable   Comments:      Sandra Hayes PA-C See dictated operative report for full details

## 2018-04-30 NOTE — OP NOTE
General Surgery Operative Note    PREOPERATIVE DIAGNOSIS:  Right inguinal hernia    POSTOPERATIVE DIAGNOSIS:  Right inguinal hernia    PROCEDURE:  Laparoscopic right inguinal hernia repair    ANESTHESIA:  General.    SURGEON:  Nuha Powell MD    ASSISTANT:   Sandra Hayes PA-C  The physician s assistant was medically necessary for their expertise in camera management, suctioning and retraction.        ESTIMATED BLOOD LOSS:  5ml    INTRAOPERATIVE FINDINGS:  Indirect and direct inguinal hernias    OPERATIVE INDICATIONS: Ms. Rosales has a symptomatic Right inguinal hernia. Options were discussed and it was elected to proceed with laparoscopic repair. Potential risks of bleeding, infection, neurovascular injury to the vas deferens or testicle, recurrent hernia, chronic pain were all reviewed in detail and he wished to proceed.     DESCRIPTION OF PROCEDURE: The patient was taken to the operating suite and uneventfully endotracheally intubated. Perioperative antibiotics were given. The abdomen and groin were prepped and draped in the usual sterile fashion. Surgeon initiated timeout was performed verifying the correct patient, procedure, site, and surgeon. All in the room were in agreement. A mixture of lidocaine and marcaine was injected in the infra umbilical area.    A curvilinear incision was made at the underside of the umbilicus. Bovie cautery was used to get through the subcutaneous tissue. The anterior rectus sheath was encountered and sharply incised. The rectus muscle was retracted laterally and the dissecting balloon was passed along the posterior rectus sheath toward the pubic bone. The balloon was filled with air with the hand pump under direct visualization. The preperitoneal space was dissected nicely and the baloon held in place for 30 seconds for hemostasis. The dissecting balloon was then removed and our working balloon was placed and positioned. Local was injected and two 5 mm trocars were then placed  along the lower midline under direct laparoscopic visualization. We began our dissection on the right side. Using combination of sharp and blunt dissection, a plane was created behind the abdominal wall and the underlying peritoneum. This was done in a blunt and atraumatic fashion. The inferior epigastric vessels were visualized running along the underside of the abdominal wall.  The epigastric vessels were followed down until we were able to identify the internal ring.  There was a branch of the inferior epigastric traveling medially and this was clipped and divided to improve visualization. A small indirect hernia was found and was adherent to the round ligament. The round ligament was clipped and divided and reduced intraabdominally. We continued our dissection and there was also a inferior direct hernia which was incarcerated and I had to enlarge the defect using hook cautery and was then able to reduce the hernia which contained only fat.  Coopers ligament was then cleared without significant bleeding. The femoral space was identified and no hernia present. The dissection was continued until we had an excellent space in the preperitoneal area.   A piece of laparoscopic Progrip mesh was then placed within this space. The mesh was held in excellent position under direct visualization until the insufflation was completely out of the preperitoneal space.  All trocars were removed under direct visualization. The anterior fascia was closed with an 0 Vicryl in the figure of 8 fashion. Marcaine was once again injected to the len-wound area. The incisions were completely dry without any bleeding. The skin was closed with a 4.0 Monocryl in a subcuticular fashion.  Steri-Strips were applied. All needle, sponge and instrument counts were correct.  A debriefing was performed verifying the correct procedure, sponge/instrument count, specimen indetification, and blood loss. The patient tolerated the procedure well and was  taken to the recovery room in stable condition.    Nuha Powell MD

## 2018-04-30 NOTE — ANESTHESIA POSTPROCEDURE EVALUATION
Patient: Heber Rosales    Procedure(s):  LAPAROSCOPIC RIGHT INGUINAL HERNIA REPAIR WITH MESH - Wound Class: I-Clean    Diagnosis:RIGHT INGUINAL HERNIA  Diagnosis Additional Information: No value filed.    Anesthesia Type:  General, ETT    Note:  Anesthesia Post Evaluation    Patient location during evaluation: PACU  Patient participation: Able to fully participate in evaluation  Level of consciousness: awake  Pain management: adequate  Airway patency: patent  Cardiovascular status: acceptable  Respiratory status: acceptable  Hydration status: acceptable  PONV: controlled     Anesthetic complications: None          Last vitals:  Vitals:    04/30/18 0904 04/30/18 1123 04/30/18 1130   BP: 123/81 109/73 101/73   Resp: 18 24 18   Temp: 36.8  C (98.3  F) 36.7  C (98.1  F)    SpO2: 97% 95% 99%         Electronically Signed By: Jordy Figueroa MD  April 30, 2018  11:41 AM

## 2018-05-04 ENCOUNTER — TELEPHONE (OUTPATIENT)
Dept: SURGERY | Facility: CLINIC | Age: 48
End: 2018-05-04

## 2018-05-04 NOTE — TELEPHONE ENCOUNTER
Post Surgical Follow Up Call -     Left message for patient to call with any questions or concerns regarding recent surgery.  Call back number provided.    Jonelle Brock RN

## 2018-05-14 ENCOUNTER — TELEPHONE (OUTPATIENT)
Dept: SURGERY | Facility: CLINIC | Age: 48
End: 2018-05-14

## 2018-05-14 NOTE — TELEPHONE ENCOUNTER
Name of caller: Patient    Reason for Call:  Hard ball where hernia was removed.      Surgeon:  Dr. Powell    Recent Surgery:  Yes.    If yes, when & what type:  04/30/18 double hernia repair      Best phone number to reach pt at is: [phone]  Ok to leave a message with medical info? Yes.    Pharmacy preferred (if calling for a refill):

## 2018-05-14 NOTE — TELEPHONE ENCOUNTER
Attempted to call patient back.  No answer.  Left message encouraging her to call if she would like to further discuss present concern.    Also informed her that she may have developed a seroma, which the body can absorb on it's own, but if it is painful or increasing in size, or if she is exhibiting any s/s of infection she should call and schedule to be seen by a PA this week in clinic.    Call back number provided.    Jonelle Brock RN

## 2018-05-22 ENCOUNTER — TELEPHONE (OUTPATIENT)
Dept: SURGERY | Facility: CLINIC | Age: 48
End: 2018-05-22

## 2018-05-22 NOTE — TELEPHONE ENCOUNTER
[facility]  Post op call note - No Answer    Heber Rosales was called for an update regarding her recovery.  Patient underwent a laparoscopic right inguinal hernia repair by Dr. Powell on 4/30/18.  There was no answer and a message was left instructing her to remove steri strips, and to call the office with any questions or concerns or to come in to be seen by a provider.     Sandra Hayes PA-C

## 2018-05-23 ENCOUNTER — TELEPHONE (OUTPATIENT)
Dept: SURGERY | Facility: CLINIC | Age: 48
End: 2018-05-23

## 2018-05-23 NOTE — TELEPHONE ENCOUNTER
"Patient returning PAs follow up phone call from yesterday.  She had laparoscopic right inguinal hernia repair with mesh on 4/30/18 with Dr. Powell.    She reports that overall she is feeling well, however the \"lump\" has not decreased in size since our last conversation on 5/14/18.  She has been utilizing heat compresses, but is reporting that there has not been any change.  Informed her that, if it is a seroma, it may need to be drained by a provider if symptoms do not subside on own.    She is also reporting an area of tenderness to the right of the hernia site.  Informed her that it is not unusual to have some discomfort for the first 6 weeks post op, sometimes longer.  Encouraged her to continue to monitor and to call if discomfort becomes worse or does not begin to improve.    Also informed her that Dr. Powell will be notified of current situation and if she has any additional recommendations, or would like for patient to see her in office, patient will be notified.    She agreed to this and will call with any additional questions or concerns.    Jonelle Brock RN  "

## 2018-06-12 ENCOUNTER — OFFICE VISIT (OUTPATIENT)
Dept: SURGERY | Facility: CLINIC | Age: 48
End: 2018-06-12
Payer: COMMERCIAL

## 2018-06-12 DIAGNOSIS — Z09 SURGERY FOLLOW-UP: Primary | ICD-10-CM

## 2018-06-12 PROCEDURE — 99024 POSTOP FOLLOW-UP VISIT: CPT | Performed by: PHYSICIAN ASSISTANT

## 2018-06-12 NOTE — MR AVS SNAPSHOT
After Visit Summary   6/12/2018    Heber Rosales    MRN: [id number]           Patient Information     Date Of Birth          1970        Visit Information        Provider Department      6/12/2018 1:00 PM Sandra Hayes PA-C [facility] [place] [facility] General Surgery      Today's Diagnoses     Surgery follow-up    -  1       Follow-ups after your visit        Your next 10 appointments already scheduled     Jul 10, 2018 10:00 AM CDT   Post Op with Nuha Powell MD   [facility] [place] ([facility] [place]    [address]  [place] [id number] [phone]              Who to contact     If you have questions or need follow up information about today's clinic visit or your schedule please contact [facility] directly at [phone].  Normal or non-critical lab and imaging results will be communicated to you by HIt[employer]art, letter or phone within 4 business days after the clinic has received the results. If you do not hear from us within 7 days, please contact the clinic through HItviewshart or phone. If you have a critical or abnormal lab result, we will notify you by phone as soon as possible.  Submit refill requests through [employer] or call your pharmacy and they will forward the refill request to us. Please allow 3 business days for your refill to be completed.          Additional Information About Your Visit        MyChart Information     [employer] gives you secure access to your electronic health record. If you see a primary care provider, you can also send messages to your care team and make appointments. If you have questions, please call your primary care clinic.  If you do not have a primary care provider, please call [phone] and they will assist you.        Care EveryWhere ID     This is your Care EveryWhere ID. This could be used by other organizations to access your [facility]  records  [id number]         Blood Pressure from Last 3 Encounters:   04/30/18 101/64   04/06/18 120/80   04/03/18 100/70    Weight from Last 3 Encounters:   04/30/18 125 lb (56.7 kg)   04/03/18 126 lb (57.2 kg)   01/17/17 140 lb (63.5 kg)              Today, you had the following     No orders found for display         Today's Medication Changes          These changes are accurate as of 6/12/18  1:59 PM.  If you have any questions, ask your nurse or doctor.               These medicines have changed or have updated prescriptions.        Dose/Directions    sertraline 100 MG tablet   Commonly known as:  ZOLOFT   This may have changed:  See the new instructions.   Used for:  Recurrent major depressive disorder, in partial remission (H)        TAKE 1 1/2 TABLETS BY MOUTH DAILY   Quantity:  135 tablet   Refills:  1         Stop taking these medicines if you haven't already. Please contact your care team if you have questions.     AMOXICILLIN PO   Stopped by:  Sandra Hayes PA-C           HYDROcodone-acetaminophen 5-325 MG per tablet   Commonly known as:  NORCO   Stopped by:  Sandra Hayes PA-C           order for DME   Stopped by:  Sandra Hayes PA-C                    Primary Care Provider Office Phone # Fax #    Mira Buck [phone]       [address] [place], [place] [id number]        Equal Access to Services     [facility] AH: Hadii abner webster hadasho Sonina, waaxda luqadaha, qaybta kaalmada adeegyada, waxdaniel sandy gan . So [facility] [phone].    ATENCIÓN: Si habla español, tiene a hansen disposición servicios gratuitos de asistencia lingüística. Llame al [phone].    We comply with applicable federal civil rights laws and [place] laws. We do not discriminate on the basis of race, color, national origin, age, disability, sex, sexual orientation, or gender identity.            Thank you!     Thank you for choosing [facility]  your care. Our goal is always to provide you with excellent care. Hearing back from our patients is one way we can continue to improve our services. Please take a few minutes to complete the written survey that you may receive in the mail after your visit with us. Thank you!             Your Updated Medication List - Protect others around you: Learn how to safely use, store and throw away your medicines at www.disposemymeds.org.          This list is accurate as of 6/12/18  1:59 PM.  Always use your most recent med list.                   Brand Name Dispense Instructions for use Diagnosis    ALPRAZolam 0.25 MG tablet    XANAX    30 tablet    Take 1 tablet (0.25 mg) by mouth 2 times daily as needed for anxiety    Panic attacks       IBUPROFEN PO      Take 600 mg by mouth every 6 hours as needed for moderate pain        senna-docusate 8.6-50 MG per tablet    SENOKOT-S;PERICOLACE    15 tablet    Take 1-2 tablets by mouth 2 times daily Take while on oral narcotics to prevent or treat constipation.    Acute post-operative pain       sertraline 100 MG tablet    ZOLOFT    135 tablet    TAKE 1 1/2 TABLETS BY MOUTH DAILY    Recurrent major depressive disorder, in partial remission (H)       VITAMIN D (CHOLECALCIFEROL) PO      Take 1,000 Units by mouth daily

## 2018-06-12 NOTE — PROGRESS NOTES
[facility] Visit Note    Subjective: Heber Rosales is here for her first postoperative visit.  She underwent a laparoscopic R inguinal hernia repair by Dr. Powell on 4/30/18.  She complains of a tender lump to the right groin and intermittent discomfort to the right groin. She states she has been taking Ibuprofen and apply heat which have helped and the lump has decreased in size.  She also states she is constipated.  She currently does not need any pain medications.  She is eating a normal diet.  She also states she has not has her period since the surgery.  The patient states she is slowly resuming normal activity.  The patient denies fever/chills, n/v/d, abdominal pain, changes in urination, or wound concerns.      Objective:  Abd: soft, non-tender, non-distended.  Wound(s): clean, dry and intact.  No erythema or drainage noted.  Small, firm mass at the right approximately 2.5cm, slightly tender.     Assessment and Plan:  S/P Lap R inguinal hernia repair  - I suspect a seroma or possibly even a reactive lymph node. I recommend she continue warm compresses and use Tylenol. She should follow up with Dr. Powell in 1 month for a re-check of her right groin.   - For her complaints of constipation, I recommend a stool softener with plenty of fluids.  - I suspect the stress of surgery may have delayed her period and anticipate her body will reset; however, I still recommend she follow up with her GYN.  - Wound(s) healing well, continue to keep clean and dry.  - Advance activity as tolerated.  - She states she understands our discussion and agrees with the plan.     Sandra Hayes PA-C  Please route or send letter to:  Primary Care Provider (PCP)

## 2018-07-10 ENCOUNTER — OFFICE VISIT (OUTPATIENT)
Dept: SURGERY | Facility: CLINIC | Age: 48
End: 2018-07-10
Payer: COMMERCIAL

## 2018-07-10 DIAGNOSIS — Z09 SURGERY FOLLOW-UP: Primary | ICD-10-CM

## 2018-07-10 PROCEDURE — 99024 POSTOP FOLLOW-UP VISIT: CPT | Performed by: SURGERY

## 2018-07-10 NOTE — PROGRESS NOTES
Surgery Postoperative Note    S: Heber returns for 8 week follow up after a laparoscopic right inguinal hernia repair completed on 4/30/2018. She had seen Sandra Goins PA-C on 6/12 for intermittent pain in the right groin and a tender lump. At the time a seroma or reactive lymph node was suspected with a plan for follow up in 1 month. Today she reports the lump is much smaller and no longer tender. She was a little worried it was still present and wanted me to look at it.     Abdomen: incisions well healed. Right groin - 1cm palpable lump inferior to the inguinal ligament which feels like a reactive node vs resolving seroma,       A/P  Heber Rosales is recovering from a right Laparoscopic Inguinal Hernia. I expect if the small lump is a resolving seroma it should continue to resolve with more time, if a reactive node it will become less prominent with more time.  I advised her to slowly return to regular activity. I expect she will make a complete recovery without issues. She will follow up prn going forward. 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     Thank you for the opportunity to help in her care.    Nuha Powell  [facility]  [phone]    Please route or send letter to:  Primary Care Provider (PCP) and Referring Provider

## 2018-07-10 NOTE — MR AVS SNAPSHOT
After Visit Summary   7/10/2018    Heber Rosales    MRN: [id number]           Patient Information     Date Of Birth          1970        Visit Information        Provider Department      7/10/2018 10:00 AM Nuha Powell MD [facility] General Surgery      Today's Diagnoses     Surgery follow-up    -  1       Follow-ups after your visit        Who to contact     If you have questions or need follow up information about today's clinic visit or your schedule please contact [facility] directly at [phone].  Normal or non-critical lab and imaging results will be communicated to you by Share[employer]hart, letter or phone within 4 business days after the clinic has received the results. If you do not hear from us within 7 days, please contact the clinic through [employer] or phone. If you have a critical or abnormal lab result, we will notify you by phone as soon as possible.  Submit refill requests through [employer] or call your pharmacy and they will forward the refill request to us. Please allow 3 business days for your refill to be completed.          Additional Information About Your Visit        MyChart Information     [employer] gives you secure access to your electronic health record. If you see a primary care provider, you can also send messages to your care team and make appointments. If you have questions, please call your primary care clinic.  If you do not have a primary care provider, please call [phone] and they will assist you.        Care EveryWhere ID     This is your Care EveryWhere ID. This could be used by other organizations to access your [facility] medical records  [id number]         Blood Pressure from Last 3 Encounters:   04/30/18 101/64   04/06/18 120/80   04/03/18 100/70    Weight from Last 3 Encounters:   04/30/18 125 lb (56.7 kg)   04/03/18 126 lb (57.2 kg)   01/17/17 140 lb (63.5 kg)              Today, you had  the following     No orders found for display         Today's Medication Changes          These changes are accurate as of 7/10/18 10:12 AM.  If you have any questions, ask your nurse or doctor.               These medicines have changed or have updated prescriptions.        Dose/Directions    sertraline 100 MG tablet   Commonly known as:  ZOLOFT   This may have changed:  See the new instructions.   Used for:  Recurrent major depressive disorder, in partial remission (H)        TAKE 1 1/2 TABLETS BY MOUTH DAILY   Quantity:  135 tablet   Refills:  1         Stop taking these medicines if you haven't already. Please contact your care team if you have questions.     IBUPROFEN PO   Stopped by:  Nuha Powell MD           senna-docusate 8.6-50 MG per tablet   Commonly known as:  SENOKOT-S;PERICOLACE   Stopped by:  Nuha Powell MD                    Primary Care Provider Office Phone # Fax #    Mira Buck [phone]       [address] [facility] [address]        Equal Access to Services     [facility] AH: Hadii aad ku hadasho Soomaali, waaxda luqadaha, qaybta kaalmada adeegyada, waxay idiin hayaan adekendra gan . So [facility] [place] [facility] [phone].    ATENCIÓN: Si habla español, tiene a hansen disposición servicios gratuitos de asistencia lingüística. [facility] [phone].    We comply with applicable federal civil rights laws and [place] laws. We do not discriminate on the basis of race, color, national origin, age, disability, sex, sexual orientation, or gender identity.            Thank you!     Thank you for choosing [facility]  for your care. Our goal is always to provide you with excellent care. Hearing back from our patients is one way we can continue to improve our services. Please take a few minutes to complete the written survey that you may receive in the mail after your visit with us. Thank you!             Your Updated Medication List - Protect  others around you: Learn how to safely use, store and throw away your medicines at www.disposemymeds.org.          This list is accurate as of 7/10/18 10:12 AM.  Always use your most recent med list.                   Brand Name Dispense Instructions for use Diagnosis    ALPRAZolam 0.25 MG tablet    XANAX    30 tablet    Take 1 tablet (0.25 mg) by mouth 2 times daily as needed for anxiety    Panic attacks       sertraline 100 MG tablet    ZOLOFT    135 tablet    TAKE 1 1/2 TABLETS BY MOUTH DAILY    Recurrent major depressive disorder, in partial remission (H)       VITAMIN D (CHOLECALCIFEROL) PO      Take 1,000 Units by mouth daily

## 2018-07-31 ENCOUNTER — OFFICE VISIT (OUTPATIENT)
Dept: FAMILY MEDICINE | Facility: CLINIC | Age: 48
End: 2018-07-31
Payer: COMMERCIAL

## 2018-07-31 VITALS
DIASTOLIC BLOOD PRESSURE: 62 MMHG | WEIGHT: 123 LBS | BODY MASS INDEX: 21.11 KG/M2 | HEART RATE: 68 BPM | TEMPERATURE: 97.2 F | SYSTOLIC BLOOD PRESSURE: 98 MMHG

## 2018-07-31 DIAGNOSIS — F41.9 ANXIETY: Primary | ICD-10-CM

## 2018-07-31 PROCEDURE — 99213 OFFICE O/P EST LOW 20 MIN: CPT | Performed by: INTERNAL MEDICINE

## 2018-07-31 RX ORDER — CITALOPRAM HYDROBROMIDE 20 MG/1
TABLET ORAL
Qty: 60 TABLET | Refills: 3 | Status: SHIPPED | OUTPATIENT
Start: 2018-07-31 | End: 2019-06-23

## 2018-07-31 ASSESSMENT — ANXIETY QUESTIONNAIRES
5. BEING SO RESTLESS THAT IT IS HARD TO SIT STILL: NOT AT ALL
1. FEELING NERVOUS, ANXIOUS, OR ON EDGE: NEARLY EVERY DAY
3. WORRYING TOO MUCH ABOUT DIFFERENT THINGS: NEARLY EVERY DAY
6. BECOMING EASILY ANNOYED OR IRRITABLE: SEVERAL DAYS
IF YOU CHECKED OFF ANY PROBLEMS ON THIS QUESTIONNAIRE, HOW DIFFICULT HAVE THESE PROBLEMS MADE IT FOR YOU TO DO YOUR WORK, TAKE CARE OF THINGS AT HOME, OR GET ALONG WITH OTHER PEOPLE: SOMEWHAT DIFFICULT
GAD7 TOTAL SCORE: 14
2. NOT BEING ABLE TO STOP OR CONTROL WORRYING: NEARLY EVERY DAY
7. FEELING AFRAID AS IF SOMETHING AWFUL MIGHT HAPPEN: MORE THAN HALF THE DAYS

## 2018-07-31 ASSESSMENT — PATIENT HEALTH QUESTIONNAIRE - PHQ9: 5. POOR APPETITE OR OVEREATING: MORE THAN HALF THE DAYS

## 2018-07-31 NOTE — MR AVS SNAPSHOT
After Visit Summary   7/31/2018    Heber Rosales    MRN: [id number]           Patient Information     Date Of Birth          1970        Visit Information        Provider Department      7/31/2018 11:40 AM Mira Buck MD [facility] [place]        Today's Diagnoses     Anxiety    -  1       Follow-ups after your visit        Follow-up notes from your care team     Return in about 1 month (around 8/31/2018) for e-visit follow up for anxiety .      Who to contact     If you have questions or need follow up information about today's clinic visit or your schedule please contact [facility] [place] directly at [phone].  Normal or non-critical lab and imaging results will be communicated to you by MyChart, letter or phone within 4 business days after the clinic has received the results. If you do not hear from us within 7 days, please contact the clinic through Q[employer] ricardo plchart or phone. If you have a critical or abnormal lab result, we will notify you by phone as soon as possible.  Submit refill requests through [employer] or call your pharmacy and they will forward the refill request to us. Please allow 3 business days for your refill to be completed.          Additional Information About Your Visit        MyChart Information     [employer] gives you secure access to your electronic health record. If you see a primary care provider, you can also send messages to your care team and make appointments. If you have questions, please call your primary care clinic.  If you do not have a primary care provider, please call [phone] and they will assist you.        Care EveryWhere ID     This is your Care EveryWhere ID. This could be used by other organizations to access your [facility] medical records  [id number]        Your Vitals Were     Pulse Temperature Last Period BMI (Body Mass Index)          68 97.2  F (36.2  C) (Tympanic) 07/09/2018 21.11 kg/m2         Blood Pressure  from Last 3 Encounters:   07/31/18 98/62   04/30/18 101/64   04/06/18 120/80    Weight from Last 3 Encounters:   07/31/18 123 lb (55.8 kg)   04/30/18 125 lb (56.7 kg)   04/03/18 126 lb (57.2 kg)              Today, you had the following     No orders found for display         Today's Medication Changes          These changes are accurate as of 7/31/18 12:01 PM.  If you have any questions, ask your nurse or doctor.               Start taking these medicines.        Dose/Directions    citalopram 20 MG tablet   Commonly known as:  celeXA   Used for:  Anxiety   Started by:  Mira Buck MD        Take 1/2 tablet (10 mg) for 1-2 weeks, then increase to 1 tablet orally daily   Quantity:  60 tablet   Refills:  3            Where to get your medicines      These medications were sent to [facility] - [place], [place] - [address] AT [place] [address]  [address] [address] [facility] [place] [address]    Hours:  24-hours Phone:  [phone]     citalopram 20 MG tablet                Primary Care Provider Office Phone # Fax #    Mira Buck [phone]       [address] [facility] [address]        Equal Access to Services     [facility] AH: Hadii abner ku hadasho Soomaali, waaxda luqadaha, qaybta kaalmada michaelegyada, jocelyn gan . So [facility] [phone].    ATENCIÓN: Si habla español, tiene a hansen disposición servicios gratuitos de asistencia lingüística. Kiki al [phone].    We comply with applicable federal civil rights laws and [place] laws. We do not discriminate on the basis of race, color, national origin, age, disability, sex, sexual orientation, or gender identity.            Thank you!     Thank you for choosing [facility] [place]  for your care. Our goal is always to provide you with excellent care. Hearing back from our patients is one way we can continue to improve our services. Please take a few  minutes to complete the written survey that you may receive in the mail after your visit with us. Thank you!             Your Updated Medication List - Protect others around you: Learn how to safely use, store and throw away your medicines at www.disposemymeds.org.          This list is accurate as of 7/31/18 12:01 PM.  Always use your most recent med list.                   Brand Name Dispense Instructions for use Diagnosis    ALPRAZolam 0.25 MG tablet    XANAX    30 tablet    Take 1 tablet (0.25 mg) by mouth 2 times daily as needed for anxiety    Panic attacks       citalopram 20 MG tablet    celeXA    60 tablet    Take 1/2 tablet (10 mg) for 1-2 weeks, then increase to 1 tablet orally daily    Anxiety       sertraline 100 MG tablet    ZOLOFT    135 tablet    TAKE 1 1/2 TABLETS BY MOUTH DAILY    Recurrent major depressive disorder, in partial remission (H)       VITAMIN D (CHOLECALCIFEROL) PO      Take 1,000 Units by mouth daily

## 2018-07-31 NOTE — PROGRESS NOTES
SUBJECTIVE:   Heber Rosales is a 48 year old female who presents to clinic today for the following health issues:      Anxiety Follow-Up    Status since last visit: Worsened     Other associated symptoms:None - more anxious - had a panic attack . Anxiety doesn't dissipate     Complicating factors:   Significant life event: Yes-     Current substance abuse: None  Depression symptoms: No  GUICHO-7 SCORE 8/22/2014 8/20/2015 6/30/2016   Total Score 9 - -   Total Score - 2 7         Amount of exercise or physical activity: None    Problems taking medications regularly: Yes,  Stopped sertraline, made her nauseated and didn't feel like it was helping - several weeks ago     Medication side effects: noted     Diet: regular (no restrictions)      Heber is here with anxiety.  In the last year there have been a lot of stressors and big events.  Recently her niece was in a bad car accident, multiple fractures.  She is going to be okay, but still in the hospital and a long road ahead to recovery.  She is currently is taking 50mg sertraline, which has been for a while, but more recently it has been causing her a lot of stomach upset.  Wondering if there is another medication she could switch to.        Reviewed and updated as needed this visit by clinical staff  Tobacco  Allergies  Meds       Reviewed and updated as needed this visit by Provider  Meds         ROS:  Psych  Reviewed,  otherwise negative unless noted above.       OBJECTIVE:     BP 98/62 (BP Location: Left arm, Patient Position: Chair, Cuff Size: Adult Regular)  Pulse 68  Temp 97.2  F (36.2  C) (Tympanic)  Wt 123 lb (55.8 kg)  LMP 07/09/2018  BMI 21.11 kg/m2  Body mass index is 21.11 kg/(m^2).  GENERAL: healthy, alert and no distress  PSYCH: mentation appears normal, affect normal/bright        ASSESSMENT/PLAN:       1. Anxiety  Sertraline is the only medication I can see that she has been on in the past for anxiety and depression.  Will have her  stop that and switch to citalopram.  Hopefully this will cause less side effects.  - citalopram (CELEXA) 20 MG tablet; Take 1/2 tablet (10 mg) for 1-2 weeks, then increase to 1 tablet orally daily  Dispense: 60 tablet; Refill: 3    F/U - evisit/mychart update in 1-2 months.      Mira Buck MD  [facility]

## 2018-08-01 ASSESSMENT — PATIENT HEALTH QUESTIONNAIRE - PHQ9: SUM OF ALL RESPONSES TO PHQ QUESTIONS 1-9: 2

## 2018-08-01 ASSESSMENT — ANXIETY QUESTIONNAIRES: GAD7 TOTAL SCORE: 14

## 2018-08-03 ENCOUNTER — TELEPHONE (OUTPATIENT)
Dept: FAMILY MEDICINE | Facility: CLINIC | Age: 48
End: 2018-08-03

## 2018-08-03 NOTE — TELEPHONE ENCOUNTER
patient calling in to ask about side effects of Citalopram: weight gain/drowsy/agressive behavior/suicidal behavior    Medication doesn't have a listing for anxiety: advised that this is a medication that is routinely prescribed for depression an anxiety    Advised patient that some patient may have these side effects but it is usually a rare occurrence- be aware of the increased appetite if this occurs and stick to your regular diet  Be aware of worsening symptoms ofsuicidal thoughts and call asap if she ifs noticing any of these symptoms occur  Drowsiness: advised that if this is a side effect for her, she can take medication at night (triage to call patient back if provider is not OK with this )    Patient feels better about starting this medication and will call if not helping symptoms or of any negative side effects.    Arielle Rodriguez RN BSN  [facility]  [phone]

## 2018-08-03 NOTE — TELEPHONE ENCOUNTER
Appropriate advice.  If he has any of the above symptoms while starting the medication he should follow-up.

## 2019-01-09 ENCOUNTER — TELEPHONE (OUTPATIENT)
Dept: FAMILY MEDICINE | Facility: CLINIC | Age: 49
End: 2019-01-09

## 2019-01-09 NOTE — TELEPHONE ENCOUNTER
Needs of attention regarding:  -Cervical Cancer Screening    Health Maintenance Topics with due status: Overdue       Topic Date Due    HIV SCREEN (SYSTEM ASSIGNED) 04/06/1989    PAP Q3 YR 08/07/2015    INFLUENZA VACCINE 09/01/2018     Health Maintenance Topics with due status: Due Soon       Topic Date Due    PHQ-9 Q6 MONTHS 01/31/2019       Communication:  SEE Letter

## 2019-01-09 NOTE — LETTER
January 9, 2019  Heber Kruger Rosales  [address] [place] [place] [address]    Dear Heber,    I care about your health and have reviewed your health plan. I have reviewed your medical conditions, medication list, and lab results and am making recommendations based on this review, to better manage your health.    You are in particular need of attention regarding:  -Cervical Cancer Screening    I am recommending that you:  -schedule a WELLNESS (Physical) APPOINTMENT with me.      -schedule a PAP SMEAR EXAM which is due.  Please disregard this reminder if you have had this exam elsewhere within the last year.  It would be helpful for us to have a copy of your recent pap smear report in our file so that we can best coordinate your care.    Please call us at [phone] (or use IT MOVES IT) to address the above recommendations.     Thank you for trusting [facility] and we appreciate the opportunity to serve you.  We look forward to supporting your healthcare needs in the future.    Healthy Regards,    Mira Buck MD

## 2019-06-23 ENCOUNTER — TELEPHONE (OUTPATIENT)
Dept: FAMILY MEDICINE | Facility: CLINIC | Age: 49
End: 2019-06-23

## 2019-06-23 DIAGNOSIS — F41.9 ANXIETY: ICD-10-CM

## 2019-06-24 NOTE — TELEPHONE ENCOUNTER
"Requested Prescriptions   Pending Prescriptions Disp Refills     citalopram (CELEXA) 20 MG tablet [Pharmacy Med Name: CITALOPRAM 20MG TABLETS] 60 tablet 0     Sig: TAKE 1/2 TABLET BY MOUTH FOR 1 TO 2 WEEKS, THEN INCREASE TO 1 TABLET BY MOUTH DAILY       SSRIs Protocol Passed - 6/23/2019  8:12 AM        Passed - Recent (12 mo) or future (30 days) visit within the authorizing provider's specialty     Patient had office visit in the last 12 months or has a visit in the next 30 days with authorizing provider or within the authorizing provider's specialty.  See \"Patient Info\" tab in inbasket, or \"Choose Columns\" in Meds & Orders section of the refill encounter.              Passed - Medication is active on med list        Passed - Patient is age 18 or older        Passed - No active pregnancy on record        Passed - No positive pregnancy test in last 12 months        Last Written Prescription Date:  7/31/2018  Last Fill Quantity: 60,  # refills: 3   Last office visit: 7/31/2018 with prescribing provider:  7/31/2018   Future Office Visit:    PHQ-9 SCORE 6/30/2016 11/30/2016 7/31/2018   PHQ-9 Total Score - - -   PHQ-9 Total Score MyChart - 2 (Minimal depression) -   PHQ-9 Total Score 2 - 2     "

## 2019-06-25 NOTE — TELEPHONE ENCOUNTER
Left message to call clinic regarding refill request.    Arielle Rodriguez,RN BSN  [facility]  [phone]

## 2019-06-26 RX ORDER — CITALOPRAM HYDROBROMIDE 20 MG/1
TABLET ORAL
Qty: 60 TABLET | Refills: 0 | Status: SHIPPED | OUTPATIENT
Start: 2019-06-26 | End: 2019-08-26

## 2019-06-26 NOTE — TELEPHONE ENCOUNTER
Script written 7/8/18 #60 x 3 should have been out in OCT 2018- need to know who is filling or if he us restarting    Arielle Rodriguez,RN BSN  [facility]  [phone]

## 2019-06-26 NOTE — TELEPHONE ENCOUNTER
Reason for call:  Other   Patient called regarding (reason for call): patient is returning message to call back regarding refill   Additional comments: returning their message. Call patient back     Phone number to reach patient:  Cell number on file:    Telephone Information:   Mobile [phone]       Best Time:  Anytime today, tomorrow after 12pm    Can we leave a detailed message on this number?  YES

## 2019-06-26 NOTE — TELEPHONE ENCOUNTER
Short refill ordered; agree she is due for annual visit in the next couple months.     Mira Buck MD

## 2019-06-26 NOTE — TELEPHONE ENCOUNTER
Patient stated she has her annual exam with her OB/GYN, advised patient she would need an OV with provider for further refills.  Patient stated understanding and was agreeable with plan.    CHEVY GrubbsN, RN  [employer] [facility]

## 2019-06-26 NOTE — TELEPHONE ENCOUNTER
Routing refill request to provider for review/approval because:  A break in medication    Returned call to patient.  Patient stated she had been forgetting to to take her citalopram and thought she was doing well without it until one day her  had mentioned she would probably not be as anxious if she took her medication.  Patient states she started taking a 1/2 tablet again recently.    Advised patient an OV would be needed as per LOV note on 7/31/2018.  Patient stated understanding and was agreeable with plan.        CHEVY GrubbsN, RN  [employer] [facility]

## 2019-07-17 ENCOUNTER — TELEPHONE (OUTPATIENT)
Dept: FAMILY MEDICINE | Facility: CLINIC | Age: 49
End: 2019-07-17

## 2019-07-17 ENCOUNTER — OFFICE VISIT (OUTPATIENT)
Dept: FAMILY MEDICINE | Facility: CLINIC | Age: 49
End: 2019-07-17
Payer: COMMERCIAL

## 2019-07-17 VITALS
HEIGHT: 64 IN | BODY MASS INDEX: 21.51 KG/M2 | WEIGHT: 126 LBS | OXYGEN SATURATION: 97 % | DIASTOLIC BLOOD PRESSURE: 60 MMHG | TEMPERATURE: 97.6 F | SYSTOLIC BLOOD PRESSURE: 110 MMHG | HEART RATE: 86 BPM

## 2019-07-17 DIAGNOSIS — F41.9 ANXIETY AND DEPRESSION: Primary | ICD-10-CM

## 2019-07-17 DIAGNOSIS — F32.A ANXIETY AND DEPRESSION: Primary | ICD-10-CM

## 2019-07-17 DIAGNOSIS — F41.0 PANIC ATTACKS: ICD-10-CM

## 2019-07-17 PROCEDURE — 99213 OFFICE O/P EST LOW 20 MIN: CPT | Performed by: INTERNAL MEDICINE

## 2019-07-17 RX ORDER — ALPRAZOLAM 0.25 MG
0.25 TABLET ORAL 2 TIMES DAILY PRN
Qty: 30 TABLET | Refills: 1 | Status: SHIPPED | OUTPATIENT
Start: 2019-07-17

## 2019-07-17 ASSESSMENT — PATIENT HEALTH QUESTIONNAIRE - PHQ9
5. POOR APPETITE OR OVEREATING: NEARLY EVERY DAY
SUM OF ALL RESPONSES TO PHQ QUESTIONS 1-9: 2

## 2019-07-17 ASSESSMENT — ANXIETY QUESTIONNAIRES
3. WORRYING TOO MUCH ABOUT DIFFERENT THINGS: NEARLY EVERY DAY
GAD7 TOTAL SCORE: 21
6. BECOMING EASILY ANNOYED OR IRRITABLE: NEARLY EVERY DAY
5. BEING SO RESTLESS THAT IT IS HARD TO SIT STILL: NEARLY EVERY DAY
7. FEELING AFRAID AS IF SOMETHING AWFUL MIGHT HAPPEN: NEARLY EVERY DAY
2. NOT BEING ABLE TO STOP OR CONTROL WORRYING: NEARLY EVERY DAY
1. FEELING NERVOUS, ANXIOUS, OR ON EDGE: NEARLY EVERY DAY

## 2019-07-17 ASSESSMENT — MIFFLIN-ST. JEOR: SCORE: 1186.53

## 2019-07-17 NOTE — PATIENT INSTRUCTIONS
Take the whole tab of citalopram for 2 weeks, if feeling okay on that can go up to 2 tabs.    Send me an update through an e-visit in 1 month

## 2019-07-17 NOTE — PROGRESS NOTES
"Subjective     Heber Saskia Rosales is a 49 year old female who presents to clinic today for the following health issues:    HPI   Depression and Anxiety Follow-Up    How are you doing with your depression since your last visit? Worsened daughter recently diagnosed diabetes and is having a hard time dealing with this.     How are you doing with your anxiety since your last visit?  Angie Buck is here for follow-up of depression and anxiety.  Her anxiety has been out of control for the past week.  6 days ago her 13-year-old daughter was diagnosed with type 1 diabetes.  This is been overwhelming for her, she is worried about doing something wrong.  Also worried about lots of things out of her control in the future, like how her daughter is going to do in high school and college with her diabetes.  They are supposed to be checking 2 AM blood sugars, and Heber is so anxious that this time it is very hard for her to get back to sleep.  Adding to this is that they have had a lot of losses in the past year or 2, with parents and in-laws.    Last year we switch from sertraline to citalopram.  She took this consistently for about 3 months and did feel like it was a little more effective than the sertraline.  But since then has been taking it pretty inconsistently.  She restarted half of the 20 mg tablet about a month ago.    PHQ 6/30/2016 7/31/2018 7/17/2019   PHQ-9 Total Score 2 2 2   Q9: Thoughts of better off dead/self-harm past 2 weeks Not at all Not at all Not at all     GUICHO-7 SCORE 6/30/2016 7/31/2018 7/17/2019   Total Score - - -   Total Score 7 14 21               Reviewed and updated as needed this visit by Provider  Rodriguez Dunn         Review of Systems   Psych reviewed,  otherwise negative unless noted above.        Objective    /60   Pulse 86   Temp 97.6  F (36.4  C) (Tympanic)   Ht 1.626 m (5' 4\")   Wt 57.2 kg (126 lb)   SpO2 97%   BMI 21.63 kg/m    Body mass index is 21.63 kg/m .  Physical " Exam   GENERAL: healthy, alert and no distress  PSYCH: mentation appears normal, affect appropriate, tearful and appearance well groomed            Assessment & Plan     1. Anxiety and depression  Heber is here with worsening anxiety in the setting of new diagnosis DM1 and loss of parents and in laws in the past year or two.  She has only been taking 10mg of citalopram for the past few weeks. Will have her increase to 20mg, go up to 40mg after 2 weeks if tolerating rather than switching to another medication.  Fluoxetine may be a good option in the future due to its long half life.     2. Panic attacks  Uses infrequently, refill ordered   - ALPRAZolam (XANAX) 0.25 MG tablet; Take 1 tablet (0.25 mg) by mouth 2 times daily as needed for anxiety  Dispense: 30 tablet; Refill: 1         Return in about 1 month (around 8/17/2019) for e-visit for anxiety .    Mira Buck MD  [facility]

## 2019-07-17 NOTE — TELEPHONE ENCOUNTER
Reason for Call:  Other call back    Detailed comments: Pt is calling stating she is having panic attacks being daughter was diagnosed with diabetes.  Pt has to get up at 2 am to check her blood sugar and then will have a anxiety attack.  Wondering if her meds should be changed as her present meds are not helping    Phone Number Patient can be reached at: Cell number on file:    Telephone Information:   Mobile [phone]       Best Time: anytime    Can we leave a detailed message on this number? YES    Call taken on 7/17/2019 at 7:57 AM by Abbie Casillas

## 2019-07-18 ASSESSMENT — ANXIETY QUESTIONNAIRES: GAD7 TOTAL SCORE: 21

## 2019-11-06 ENCOUNTER — HEALTH MAINTENANCE LETTER (OUTPATIENT)
Age: 49
End: 2019-11-06

## 2019-12-13 ENCOUNTER — E-VISIT (OUTPATIENT)
Dept: FAMILY MEDICINE | Facility: CLINIC | Age: 49
End: 2019-12-13
Payer: COMMERCIAL

## 2019-12-13 ENCOUNTER — TELEPHONE (OUTPATIENT)
Dept: FAMILY MEDICINE | Facility: CLINIC | Age: 49
End: 2019-12-13

## 2019-12-13 DIAGNOSIS — F41.9 ANXIETY: ICD-10-CM

## 2019-12-13 PROCEDURE — 99444 ZZC PHYSICIAN ONLINE EVALUATION & MANAGEMENT SERVICE: CPT | Performed by: INTERNAL MEDICINE

## 2019-12-13 NOTE — TELEPHONE ENCOUNTER
Reason for Call:  Same Day Appointment, Requested Provider:      PCP: Mira Buck    Reason for visit: Med check     Duration of symptoms:     Have you been treated for this in the past? Yes    Additional comments: Patient would like to have phone visit for med check to have medications refilled     Can we leave a detailed message on this number? YES    Phone number patient can be reached at: Cell number on file:    Telephone Information:   Mobile [phone]       Best Time: anytime     Call taken on 12/13/2019 at 7:56 AM by Mary Adams

## 2019-12-13 NOTE — TELEPHONE ENCOUNTER
Spoke with the pt and she scheduled a telephone visit for Monday at 12:00. However, she is going to try to do an E Visit today after work. In case she can't log into her My Chart and do  An E Visit, she will do the telephone visit on Monday.  Carol Ann Pineda,

## 2019-12-13 NOTE — TELEPHONE ENCOUNTER
Routing to TC to advise of note below. Thank you.    Radha Cervantes RN, BSN  [facility]

## 2019-12-13 NOTE — TELEPHONE ENCOUNTER
We can do an phone visit today.  Or she has mychart, we could do an e-visit if she'd prefer.     Mira Buck MD

## 2019-12-14 RX ORDER — CITALOPRAM HYDROBROMIDE 20 MG/1
20 TABLET ORAL DAILY
Qty: 90 TABLET | Refills: 1 | Status: SHIPPED | OUTPATIENT
Start: 2019-12-14 | End: 2020-05-13

## 2020-03-24 ENCOUNTER — TELEPHONE (OUTPATIENT)
Dept: FAMILY MEDICINE | Facility: CLINIC | Age: 50
End: 2020-03-24

## 2020-03-24 ENCOUNTER — E-VISIT (OUTPATIENT)
Dept: FAMILY MEDICINE | Facility: CLINIC | Age: 50
End: 2020-03-24
Payer: COMMERCIAL

## 2020-03-24 DIAGNOSIS — R41.840 POOR CONCENTRATION: Primary | ICD-10-CM

## 2020-03-24 PROCEDURE — 99421 OL DIG E/M SVC 5-10 MIN: CPT | Performed by: INTERNAL MEDICINE

## 2020-03-24 ASSESSMENT — ANXIETY QUESTIONNAIRES
3. WORRYING TOO MUCH ABOUT DIFFERENT THINGS: SEVERAL DAYS
1. FEELING NERVOUS, ANXIOUS, OR ON EDGE: SEVERAL DAYS
7. FEELING AFRAID AS IF SOMETHING AWFUL MIGHT HAPPEN: NOT AT ALL
5. BEING SO RESTLESS THAT IT IS HARD TO SIT STILL: NOT AT ALL
2. NOT BEING ABLE TO STOP OR CONTROL WORRYING: NOT AT ALL
GAD7 TOTAL SCORE: 4
6. BECOMING EASILY ANNOYED OR IRRITABLE: NOT AT ALL
IF YOU CHECKED OFF ANY PROBLEMS ON THIS QUESTIONNAIRE, HOW DIFFICULT HAVE THESE PROBLEMS MADE IT FOR YOU TO DO YOUR WORK, TAKE CARE OF THINGS AT HOME, OR GET ALONG WITH OTHER PEOPLE: EXTREMELY DIFFICULT

## 2020-03-24 ASSESSMENT — PATIENT HEALTH QUESTIONNAIRE - PHQ9
5. POOR APPETITE OR OVEREATING: MORE THAN HALF THE DAYS
SUM OF ALL RESPONSES TO PHQ QUESTIONS 1-9: 4

## 2020-03-24 NOTE — TELEPHONE ENCOUNTER
Reason for Call:  Other call back    Detailed comments: Pt takes celexa but is having trouble focusing.  Wondering if can increase dosage    Phone Number Patient can be reached at: Cell number on file:    Telephone Information:   Mobile [phone]       Best Time: anytime    Can we leave a detailed message on this number? YES    Call taken on 3/24/2020 at 8:21 AM by Abbie Casillas

## 2020-03-24 NOTE — TELEPHONE ENCOUNTER
Pt has updated PHQ 9 for panel management    PHQ-9 SCORE 7/31/2018 7/17/2019 3/24/2020   PHQ-9 Total Score - - -   PHQ-9 Total Score MyChart - - -   PHQ-9 Total Score 2 2 4     GUICHO 7 score 4    Pt reports taking citalopram 20 mg as prescribed.  States she is having problems focusing on task oriented things like work.  States her mind starts spinning and cannot concentrate.  States she is able to clean her house and other things it is just the things that she has to get done she is not able to stay focused.  Wondering if she should increase the dosing of citalopram?    Pharmacy pended.    Pt can be reached at [phone] ok to leave message if does not answer.    Emelyn BAUMANN RN  EP Triage

## 2020-03-24 NOTE — TELEPHONE ENCOUNTER
Left detailed message with MD response below, informed patient that E-visit information was sent to patient via [employer].     Radha Cervantes RN, BSN  [facility]

## 2020-03-25 ASSESSMENT — ANXIETY QUESTIONNAIRES: GAD7 TOTAL SCORE: 4

## 2020-05-13 DIAGNOSIS — F41.9 ANXIETY: ICD-10-CM

## 2020-05-13 RX ORDER — CITALOPRAM HYDROBROMIDE 20 MG/1
20 TABLET ORAL DAILY
Qty: 90 TABLET | Refills: 2 | OUTPATIENT
Start: 2020-05-13

## 2020-05-13 RX ORDER — CITALOPRAM HYDROBROMIDE 40 MG/1
40 TABLET ORAL DAILY
Qty: 90 TABLET | Refills: 1 | Status: SHIPPED | OUTPATIENT
Start: 2020-05-13

## 2020-05-13 NOTE — TELEPHONE ENCOUNTER
Per e-visit on 3/24/20 pt was advised to increase the 20 mg 1 tab daily to taking 2 tabs.  Needs a new rx for citalopram 40 mg 1 tab daily which is pended.    Please sign order for citalopram 40 mg.    Emelyn BAUMANN RN  EP Triage

## 2020-05-13 NOTE — TELEPHONE ENCOUNTER
Reason for Call:  Medication or medication refill:    Do you use a [facility]?  Name of the pharmacy and phone number for the current request:  [facility] - [phone]    Name of the medication requested: citalopram (CELEXA) 20 MG tablet     Other request: Pt states script was changed to take 2 tablets per day.  Pt is now out and needs asap  Can we leave a detailed message on this number? YES    Phone number patient can be reached at: Home number on file [phone] (home) or Cell number on file:    Telephone Information:   Mobile [phone]       Best Time: anytime    Call taken on 5/13/2020 at 8:26 AM by Abbie Casillas

## 2020-11-29 ENCOUNTER — HEALTH MAINTENANCE LETTER (OUTPATIENT)
Age: 50
End: 2020-11-29

## 2021-02-14 ENCOUNTER — HEALTH MAINTENANCE LETTER (OUTPATIENT)
Age: 51
End: 2021-02-14

## 2021-09-19 ENCOUNTER — HEALTH MAINTENANCE LETTER (OUTPATIENT)
Age: 51
End: 2021-09-19

## 2022-01-09 ENCOUNTER — HEALTH MAINTENANCE LETTER (OUTPATIENT)
Age: 52
End: 2022-01-09

## 2022-02-23 NOTE — INTERVAL H&P NOTE
This note is for the purpose of making the H&P performed in the clinic within the last 30 days available in the hospital surgical encounter.  
yes...

## 2022-03-06 ENCOUNTER — HEALTH MAINTENANCE LETTER (OUTPATIENT)
Age: 52
End: 2022-03-06

## 2022-11-21 ENCOUNTER — HEALTH MAINTENANCE LETTER (OUTPATIENT)
Age: 52
End: 2022-11-21

## 2023-04-16 ENCOUNTER — HEALTH MAINTENANCE LETTER (OUTPATIENT)
Age: 53
End: 2023-04-16

## (undated) DEVICE — ENDO TROCAR 05.5MM PEDI 24055

## (undated) DEVICE — SOL WATER IRRIG 1000ML BOTTLE 2F7114

## (undated) DEVICE — GLOVE PROTEXIS MICRO 6.0  2D73PM60

## (undated) DEVICE — SOL NACL 0.9% IRRIG 1000ML BOTTLE 07138-09

## (undated) DEVICE — ESU PENCIL W/HOLSTER E2350H

## (undated) DEVICE — GLOVE PROTEXIS BLUE W/NEU-THERA 6.5  2D73EB65

## (undated) DEVICE — ENDO TROCAR DISSECTING BALLOON OMS-PDBS2

## (undated) DEVICE — ESU HOLDER LAP INST DISP PURPLE LONG 330MM H-PRO-330

## (undated) DEVICE — ENDO TROCAR BALLOON TIP 10MM  OMS-T10SB

## (undated) DEVICE — SU VICRYL 4-0 PS-2 18" UND J496H

## (undated) DEVICE — PREP CHLORAPREP 26ML TINTED ORANGE  260815

## (undated) DEVICE — SU VICRYL 0 UR-6 27" J603H

## (undated) DEVICE — PACK LAP CHOLE SLC15LCFSD

## (undated) DEVICE — CLIP APPLIER ENDO 05MM MED/LG 176630

## (undated) DEVICE — LINEN TOWEL PACK X5 5464

## (undated) DEVICE — GOWN IMPERVIOUS BREATHABLE SMART LG 89015

## (undated) RX ORDER — ONDANSETRON 2 MG/ML
INJECTION INTRAMUSCULAR; INTRAVENOUS
Status: DISPENSED
Start: 2018-04-30

## (undated) RX ORDER — FENTANYL CITRATE 50 UG/ML
INJECTION, SOLUTION INTRAMUSCULAR; INTRAVENOUS
Status: DISPENSED
Start: 2018-04-30

## (undated) RX ORDER — ONDANSETRON 4 MG/1
TABLET, ORALLY DISINTEGRATING ORAL
Status: DISPENSED
Start: 2018-04-30

## (undated) RX ORDER — GLYCOPYRROLATE 0.2 MG/ML
INJECTION, SOLUTION INTRAMUSCULAR; INTRAVENOUS
Status: DISPENSED
Start: 2018-04-30

## (undated) RX ORDER — PROPOFOL 10 MG/ML
INJECTION, EMULSION INTRAVENOUS
Status: DISPENSED
Start: 2018-04-30

## (undated) RX ORDER — KETOROLAC TROMETHAMINE 30 MG/ML
INJECTION, SOLUTION INTRAMUSCULAR; INTRAVENOUS
Status: DISPENSED
Start: 2018-04-30

## (undated) RX ORDER — NEOSTIGMINE METHYLSULFATE 1 MG/ML
VIAL (ML) INJECTION
Status: DISPENSED
Start: 2018-04-30

## (undated) RX ORDER — CEFAZOLIN SODIUM 2 G/100ML
INJECTION, SOLUTION INTRAVENOUS
Status: DISPENSED
Start: 2018-04-30

## (undated) RX ORDER — HYDROCODONE BITARTRATE AND ACETAMINOPHEN 5; 325 MG/1; MG/1
TABLET ORAL
Status: DISPENSED
Start: 2018-04-30

## (undated) RX ORDER — BUPIVACAINE HYDROCHLORIDE AND EPINEPHRINE 5; 5 MG/ML; UG/ML
INJECTION, SOLUTION EPIDURAL; INTRACAUDAL; PERINEURAL
Status: DISPENSED
Start: 2018-04-30

## (undated) RX ORDER — DEXAMETHASONE SODIUM PHOSPHATE 4 MG/ML
INJECTION, SOLUTION INTRA-ARTICULAR; INTRALESIONAL; INTRAMUSCULAR; INTRAVENOUS; SOFT TISSUE
Status: DISPENSED
Start: 2018-04-30

## (undated) RX ORDER — LIDOCAINE HYDROCHLORIDE 20 MG/ML
INJECTION, SOLUTION EPIDURAL; INFILTRATION; INTRACAUDAL; PERINEURAL
Status: DISPENSED
Start: 2018-04-30

## (undated) RX ORDER — LIDOCAINE HYDROCHLORIDE 10 MG/ML
INJECTION, SOLUTION EPIDURAL; INFILTRATION; INTRACAUDAL; PERINEURAL
Status: DISPENSED
Start: 2018-04-30